# Patient Record
Sex: MALE | Race: WHITE | NOT HISPANIC OR LATINO | Employment: PART TIME | URBAN - METROPOLITAN AREA
[De-identification: names, ages, dates, MRNs, and addresses within clinical notes are randomized per-mention and may not be internally consistent; named-entity substitution may affect disease eponyms.]

---

## 2018-04-04 ENCOUNTER — TELEPHONE (OUTPATIENT)
Dept: OPHTHALMOLOGY | Facility: CLINIC | Age: 23
End: 2018-04-04

## 2018-05-15 ENCOUNTER — HOSPITAL ENCOUNTER (OUTPATIENT)
Dept: MRI IMAGING | Facility: CLINIC | Age: 23
Discharge: HOME | End: 2018-05-15
Attending: OPHTHALMOLOGY | Admitting: OPHTHALMOLOGY
Payer: COMMERCIAL

## 2018-05-15 ENCOUNTER — ANESTHESIA EVENT (OUTPATIENT)
Dept: SURGERY | Facility: CLINIC | Age: 23
End: 2018-05-15
Payer: COMMERCIAL

## 2018-05-15 ENCOUNTER — OFFICE VISIT (OUTPATIENT)
Dept: OPHTHALMOLOGY | Facility: CLINIC | Age: 23
End: 2018-05-15
Attending: OPHTHALMOLOGY
Payer: COMMERCIAL

## 2018-05-15 DIAGNOSIS — H50.05 ALTERNATING ESOTROPIA: Primary | ICD-10-CM

## 2018-05-15 DIAGNOSIS — H53.2 DIPLOPIA: ICD-10-CM

## 2018-05-15 DIAGNOSIS — H50.21 HYPERTROPIA OF RIGHT EYE: ICD-10-CM

## 2018-05-15 DIAGNOSIS — H50.05 ALTERNATING ESOTROPIA: ICD-10-CM

## 2018-05-15 PROCEDURE — 92015 DETERMINE REFRACTIVE STATE: CPT | Mod: ZF

## 2018-05-15 PROCEDURE — G0463 HOSPITAL OUTPT CLINIC VISIT: HCPCS | Mod: 25,ZF

## 2018-05-15 PROCEDURE — 70553 MRI BRAIN STEM W/O & W/DYE: CPT

## 2018-05-15 PROCEDURE — 92060 SENSORIMOTOR EXAMINATION: CPT | Mod: ZF | Performed by: OPHTHALMOLOGY

## 2018-05-15 PROCEDURE — 25000128 H RX IP 250 OP 636: Performed by: OPHTHALMOLOGY

## 2018-05-15 PROCEDURE — A9585 GADOBUTROL INJECTION: HCPCS | Performed by: OPHTHALMOLOGY

## 2018-05-15 RX ORDER — GADOBUTROL 604.72 MG/ML
7.5 INJECTION INTRAVENOUS ONCE
Status: COMPLETED | OUTPATIENT
Start: 2018-05-15 | End: 2018-05-15

## 2018-05-15 RX ADMIN — GADOBUTROL 6.2 ML: 604.72 INJECTION INTRAVENOUS at 15:56

## 2018-05-15 ASSESSMENT — CONF VISUAL FIELD
METHOD: COUNTING FINGERS
OD_NORMAL: 1
OS_NORMAL: 1

## 2018-05-15 ASSESSMENT — VISUAL ACUITY
OS_SC+: -1
OD_SC+: -2
METHOD: SNELLEN - LINEAR
OS_SC: 20/15
OD_SC: 20/15

## 2018-05-15 ASSESSMENT — REFRACTION
OS_SPHERE: +0.75
OS_CYLINDER: .25
OS_AXIS: 090
OD_SPHERE: +0.50
OD_CYLINDER: SPHERE

## 2018-05-15 ASSESSMENT — SLIT LAMP EXAM - LIDS
COMMENTS: NORMAL
COMMENTS: NORMAL

## 2018-05-15 ASSESSMENT — CUP TO DISC RATIO
OD_RATIO: 0.0
OS_RATIO: 0.0

## 2018-05-15 ASSESSMENT — EXTERNAL EXAM - RIGHT EYE: OD_EXAM: NORMAL

## 2018-05-15 ASSESSMENT — TONOMETRY: IOP_METHOD: BOTH EYES NORMAL BY PALPATION

## 2018-05-15 ASSESSMENT — EXTERNAL EXAM - LEFT EYE: OS_EXAM: NORMAL

## 2018-05-15 NOTE — MR AVS SNAPSHOT
After Visit Summary   5/15/2018    Lauro Stephen    MRN: [id number]           Patient Information     Date Of Birth          1995        Visit Information        Provider Department      5/15/2018 8:40 AM Lorri Guadalupe MD [facility] Peds Eye General        Today's Diagnoses     Alternating esotropia    -  1    Hypertropia of right eye        Diplopia           Follow-ups after your visit        Who to contact     Please call your clinic at [phone] to:    Ask questions about your health    Make or cancel appointments    Discuss your medicines    Learn about your test results    Speak to your doctor            Additional Information About Your Visit        MyChart Information     SocialDeck is an electronic gateway that provides easy, online access to your medical records. With SocialDeck, you can request a clinic appointment, read your test results, renew a prescription or communicate with your care team.     To sign up for SocialDeck visit the website at [email or web address]   You will be asked to enter the access code listed below, as well as some personal information. Please follow the directions to create your username and password.     Your access code is: [email or web address]  Expires: 2018  3:29 PM     Your access code will  in 90 days. If you need help or a new code, please contact your [facility] or call [phone] for assistance.        Care EveryWhere ID     This is your Care EveryWhere ID. This could be used by other organizations to access your [facility] medical records  [id number]         Blood Pressure from Last 3 Encounters:   18 115/67    Weight from Last 3 Encounters:   18 62 kg (136 lb 11 oz)              We Performed the Following     Sensorimotor        Primary Care Provider Fax #    Physician No Ref-Primary [phone]       No address on file        Equal Access to Services     LEI EDWARDS AH: Sonia oakes  Lesly, joel hobson, james paulann dobbs, figueroa morisin hayaan piedadcindy harrisonodessa lajohnathansandy kwesi. So [facility] [phone].    ATENCIÓN: Si habla español, tiene a rose disposición servicios gratuitos de asistencia lingüística. Javierame al [phone].    We comply with applicable federal civil rights laws and [place] laws. We do not discriminate on the basis of race, color, national origin, age, disability, sex, sexual orientation, or gender identity.            Thank you!     Thank you for choosing [facility] EYE GENERAL  for your care. Our goal is always to provide you with excellent care. Hearing back from our patients is one way we can continue to improve our services. Please take a few minutes to complete the written survey that you may receive in the mail after your visit with us. Thank you!             Your Updated Medication List - Protect others around you: Learn how to safely use, store and throw away your medicines at www.disposemymeds.org.      Notice  As of 5/15/2018 11:59 PM    You have not been prescribed any medications.

## 2018-05-15 NOTE — NURSING NOTE
Chief Complaint   Patient presents with     Strabismus Evaluation     Esotropia noted ~2yrs ago. Constant diplopia, but can ignore at times-turns his head to the left to use LE. Had glasses while in [place], but the Rx wasn't strong (no prisms). Dr. Bello didn't give prisms because deviation was so large. No h/o prior surgery, patching or other therapy. Scheduled for surgery tmw.      HPI    Informant(s):  pt    Symptoms:              Comments:  H/o fainting with IOP check in past.     No MRI, lab work done

## 2018-05-15 NOTE — LETTER
"May 15, 2018    Saúl Bello, [facility]  [address] [place] [address]  VIA Mail        RE:  MRN:  : Lauro Stephen  [id number]  1995     Dear Dr. Bello:    It was my pleasure to examine Lauro Stephen on 5/15/2018 at the [facility] at the [facility]. Please find my assessment and recommendations below. I have also attached the findings from today's examination to the end of this note for your records.    Chief Complaints and History of Present Illnesses   Patient presents with     Strabismus Evaluation     Esotropia noted ~2yrs ago. Constant diplopia, but can ignore at times-turns his head to the left to use LE. Had glasses while in [place], but the Rx wasn't strong (no prisms). Dr. Bello didn't give prisms because deviation was so large. No h/o prior surgery, patching or other therapy. Scheduled for surgery tmw.    Review of systems for the eyes was negative other than the pertinent positives and negatives noted in the HPI.  History is obtained from the patient    Referring provider: Saúl Bello     Primary care: No Ref-Primary, Physician   Assessment   Lauro is a 22-year-old male who presents with:       ICD-10-CM    1. 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 Hypertropia of right eye H50.21 Sensorimotor   3. Diplopia H53.2 MR Brain and Orbits         Plan  Lauro has diplopia and large angle esotropia.  I think he needs an MRI to rule out thyroid eye disease/Chiari/other intracranial process.  I recommend eye muscle surgery. Today with Lauro, I reviewed the indications, risks, benefits, and alternatives of eye muscle surgery including, but not limited to, failure obtain the desired ocular alignment (\"over\" or \"under\" correction), diplopia, and damage to any structure in or around the eye that may necessitate treatment with medicine, laser, or surgery. I further " "explained that the goal of surgery is to help control Lauro's strabismus. Surgery will not \"cure\" Lauro's strabismus or resolve/prevent the need for refractive corretion. Additional strabismus surgery may be required in the short or long term. I emphasized that regular follow-up to monitor and optimize his vision and alignment would be necessary. We also discussed the risks of surgical injury, bleeding, and infection which may necessitate further medical or surgical treatment and which may result in diplopia, loss of vision, blindness, or loss of the eye(s) in less than 1% of cases and the remote possibility of permanent damage to any organ system or death with the use of general anesthesia.  I explained that we would hide visible scars as much as possible in natural creases but that every patient heals and pigments differently resulting in a variable degree of scarring to the eyes or surrounding facial structures after surgery.  I provided multiple opportunities for questions, answered all questions to the best of my ability, and confirmed that my answers and my discussion were understood.       Further details of the management plan can be found in the \"Patient Instructions\" section which was printed and given to the patient at checkout.    Attending Physician Attestation:  Complete documentation of historical and exam elements from today's encounter can be found in the full encounter summary report (not reduplicated in this progress note).  I personally obtained the chief complaint(s) and history of present illness.  I confirmed and edited as necessary the review of systems, past medical/surgical history, family history, social history, and examination findings as documented by others; and I examined the patient myself.  I personally reviewed the relevant tests, images, and reports as documented above.  I formulated and edited as necessary the assessment and plan and discussed the findings and management " plan with the patient and family. - Lorri Guadalupe MD 5/15/2018 10:20 AM         Thank you for the opportunity to participate in Lauro's care. If you would like to discuss anything further, please do not hesitate to contact me.    Sincerely,    Lorri Guadalupe MD    CC  Lauro Stephen  [address] [address]  [address] Mail       Base Eye Exam     Visual Acuity (Snellen - Linear)      Right Left   Dist sc 20/15 -2 20/15 -1         Tonometry     Both eyes normal by palpation, 8:43 AM      Pupils      Pupils APD   Right PERRL None   Left PERRL None         Visual Fields (Counting fingers)      Left Right   Result Full Full         Neuro/Psych     Oriented x3:  Yes    Mood/Affect:  Normal      Dilation     Both eyes:  1.0% Mydriacyl, 2.5% Edwin Synephrine @ 8:43 AM            Additional Tests     Color      Right Left   Ishihara 11/11 11/11         Stereo     Fly:  -      [place] 4 Dot     Distance:  Diplopia    Near:  Diplopia      Double Silver Arvind     Right:  4 excyclo     Itorsion: none            Strabismus Exam        Method:  Alternate cover Distance Near Near +3.00DS Near Bifocals     Correction:  sc   ET' 35           RHT' 2           - - - - -tr  RET 40 -1 - - - -    R Tilt                         RET 35 -tr  - -  RET 35-37 - -  - -  RET 35       RHT 2     RHT 2       L Tilt       - - - - -1  RET 35 -tr - - - -            DVD:      DVD:           Nystagmus:  None       AHP:  left turn at times                  Fuses at near with 35 NIKKO, 37 NIKKO at dist (35 RE+2 LE), does note intermittent vertical, but resolves with blinking     Synptophore: ET 42 RH 4 Excylco 5 degrees  Fusional amps: 4 NIKKO, 4 BI, 2 up/ 1down RE, +stereo      Slit Lamp and Fundus Exam     External Exam      Right Left    External Normal Normal      Slit Lamp Exam      Right Left    Lids/Lashes Normal Normal    Conjunctiva/Sclera White and quiet White and quiet    Cornea Clear Clear    Anterior Chamber Deep and quiet Deep and  quiet    Iris Round and reactive Round and reactive    Lens Clear Clear    Vitreous Normal Normal      Fundus Exam      Right Left    Disc Normal Normal    C/D Ratio 0.0 0.0    Macula Normal Normal    Vessels Normal Normal    Periphery Normal Normal            Refraction     Cycloplegic Refraction      Sphere Cylinder Axis   Right +0.50 Sphere    Left +0.75 .25 090

## 2018-05-15 NOTE — PROGRESS NOTES
"Chief Complaints and History of Present Illnesses   Patient presents with     Strabismus Evaluation     Esotropia noted ~2yrs ago. Constant diplopia, but can ignore at times-turns his head to the left to use LE. Had glasses while in [place], but the Rx wasn't strong (no prisms). Dr. Bello didn't give prisms because deviation was so large. No h/o prior surgery, patching or other therapy. Scheduled for surgery tmw.    Review of systems for the eyes was negative other than the pertinent positives and negatives noted in the HPI.  History is obtained from the patient    Referring provider: Saúl Bello     Primary care: No Ref-Primary, Physician   Assessment   Lauro Stephen is a 22 year old male who presents with:       ICD-10-CM    1. 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 Hypertropia of right eye H50.21 Sensorimotor   3. Diplopia H53.2 MR Brain and Orbits         Plan  Lauro has diplopia and large angle esotropia.  I think he needs an MRI to rule out thyroid eye disease/Chiari/other intracranial process.  I recommend eye muscle surgery. Today with Lauro, I reviewed the indications, risks, benefits, and alternatives of eye muscle surgery including, but not limited to, failure obtain the desired ocular alignment (\"over\" or \"under\" correction), diplopia, and damage to any structure in or around the eye that may necessitate treatment with medicine, laser, or surgery. I further explained that the goal of surgery is to help control Lauro's strabismus. Surgery will not \"cure\" Lauro's strabismus or resolve/prevent the need for refractive corretion. Additional strabismus surgery may be required in the short or long term. I emphasized that regular follow-up to monitor and optimize his vision and alignment would be necessary. We also discussed the risks of surgical injury, bleeding, and infection which may necessitate further medical or surgical treatment and which may " "result in diplopia, loss of vision, blindness, or loss of the eye(s) in less than 1% of cases and the remote possibility of permanent damage to any organ system or death with the use of general anesthesia.  I explained that we would hide visible scars as much as possible in natural creases but that every patient heals and pigments differently resulting in a variable degree of scarring to the eyes or surrounding facial structures after surgery.  I provided multiple opportunities for questions, answered all questions to the best of my ability, and confirmed that my answers and my discussion were understood.       Further details of the management plan can be found in the \"Patient Instructions\" section which was printed and given to the patient at checkout.  Data Unavailable   Attending Physician Attestation:  Complete documentation of historical and exam elements from today's encounter can be found in the full encounter summary report (not reduplicated in this progress note).  I personally obtained the chief complaint(s) and history of present illness.  I confirmed and edited as necessary the review of systems, past medical/surgical history, family history, social history, and examination findings as documented by others; and I examined the patient myself.  I personally reviewed the relevant tests, images, and reports as documented above.  I formulated and edited as necessary the assessment and plan and discussed the findings and management plan with the patient and family. - Lorri Guadalupe MD 5/15/2018 10:20 AM       "

## 2018-05-16 ENCOUNTER — ANESTHESIA (OUTPATIENT)
Dept: SURGERY | Facility: CLINIC | Age: 23
End: 2018-05-16
Payer: COMMERCIAL

## 2018-05-16 ENCOUNTER — HOSPITAL ENCOUNTER (OUTPATIENT)
Facility: CLINIC | Age: 23
Discharge: HOME | End: 2018-05-16
Attending: OPHTHALMOLOGY | Admitting: OPHTHALMOLOGY
Payer: COMMERCIAL

## 2018-05-16 VITALS
BODY MASS INDEX: 21.97 KG/M2 | RESPIRATION RATE: 13 BRPM | WEIGHT: 136.69 LBS | DIASTOLIC BLOOD PRESSURE: 67 MMHG | HEIGHT: 66 IN | TEMPERATURE: 97.7 F | SYSTOLIC BLOOD PRESSURE: 115 MMHG | OXYGEN SATURATION: 96 %

## 2018-05-16 LAB — GLUCOSE BLDC GLUCOMTR-MCNC: 78 MG/DL (ref 70–99)

## 2018-05-16 PROCEDURE — 40000170 ZZH STATISTIC PRE-PROCEDURE ASSESSMENT II: Performed by: OPHTHALMOLOGY

## 2018-05-16 PROCEDURE — 25000128 H RX IP 250 OP 636: Performed by: NURSE ANESTHETIST, CERTIFIED REGISTERED

## 2018-05-16 PROCEDURE — 25000128 H RX IP 250 OP 636: Performed by: ANESTHESIOLOGY

## 2018-05-16 PROCEDURE — C9399 UNCLASSIFIED DRUGS OR BIOLOG: HCPCS | Performed by: NURSE ANESTHETIST, CERTIFIED REGISTERED

## 2018-05-16 PROCEDURE — 27210794 ZZH OR GENERAL SUPPLY STERILE: Performed by: OPHTHALMOLOGY

## 2018-05-16 PROCEDURE — 82962 GLUCOSE BLOOD TEST: CPT

## 2018-05-16 PROCEDURE — 25000566 ZZH SEVOFLURANE, EA 15 MIN: Performed by: OPHTHALMOLOGY

## 2018-05-16 PROCEDURE — 37000009 ZZH ANESTHESIA TECHNICAL FEE, EACH ADDTL 15 MIN: Performed by: OPHTHALMOLOGY

## 2018-05-16 PROCEDURE — 36000057 ZZH SURGERY LEVEL 3 1ST 30 MIN - UMMC: Performed by: OPHTHALMOLOGY

## 2018-05-16 PROCEDURE — 25000125 ZZHC RX 250: Performed by: NURSE ANESTHETIST, CERTIFIED REGISTERED

## 2018-05-16 PROCEDURE — 25000125 ZZHC RX 250: Performed by: OPHTHALMOLOGY

## 2018-05-16 PROCEDURE — 71000027 ZZH RECOVERY PHASE 2 EACH 15 MINS: Performed by: OPHTHALMOLOGY

## 2018-05-16 PROCEDURE — 25000132 ZZH RX MED GY IP 250 OP 250 PS 637: Performed by: ANESTHESIOLOGY

## 2018-05-16 PROCEDURE — 37000008 ZZH ANESTHESIA TECHNICAL FEE, 1ST 30 MIN: Performed by: OPHTHALMOLOGY

## 2018-05-16 PROCEDURE — 36000059 ZZH SURGERY LEVEL 3 EA 15 ADDTL MIN UMMC: Performed by: OPHTHALMOLOGY

## 2018-05-16 PROCEDURE — 71000014 ZZH RECOVERY PHASE 1 LEVEL 2 FIRST HR: Performed by: OPHTHALMOLOGY

## 2018-05-16 RX ORDER — HYDROMORPHONE HYDROCHLORIDE 1 MG/ML
.3-.5 INJECTION, SOLUTION INTRAMUSCULAR; INTRAVENOUS; SUBCUTANEOUS EVERY 5 MIN PRN
Status: DISCONTINUED | OUTPATIENT
Start: 2018-05-16 | End: 2018-05-16 | Stop reason: HOSPADM

## 2018-05-16 RX ORDER — OXYMETAZOLINE HYDROCHLORIDE 0.05 G/100ML
SPRAY NASAL PRN
Status: DISCONTINUED | OUTPATIENT
Start: 2018-05-16 | End: 2018-05-16 | Stop reason: HOSPADM

## 2018-05-16 RX ORDER — ACETAMINOPHEN 325 MG/1
975 TABLET ORAL ONCE
Status: COMPLETED | OUTPATIENT
Start: 2018-05-16 | End: 2018-05-16

## 2018-05-16 RX ORDER — KETOROLAC TROMETHAMINE 30 MG/ML
INJECTION, SOLUTION INTRAMUSCULAR; INTRAVENOUS PRN
Status: DISCONTINUED | OUTPATIENT
Start: 2018-05-16 | End: 2018-05-16

## 2018-05-16 RX ORDER — ONDANSETRON 2 MG/ML
4 INJECTION INTRAMUSCULAR; INTRAVENOUS EVERY 30 MIN PRN
Status: DISCONTINUED | OUTPATIENT
Start: 2018-05-16 | End: 2018-05-16 | Stop reason: HOSPADM

## 2018-05-16 RX ORDER — FENTANYL CITRATE 50 UG/ML
INJECTION, SOLUTION INTRAMUSCULAR; INTRAVENOUS PRN
Status: DISCONTINUED | OUTPATIENT
Start: 2018-05-16 | End: 2018-05-16

## 2018-05-16 RX ORDER — EPHEDRINE SULFATE 50 MG/ML
INJECTION, SOLUTION INTRAMUSCULAR; INTRAVENOUS; SUBCUTANEOUS PRN
Status: DISCONTINUED | OUTPATIENT
Start: 2018-05-16 | End: 2018-05-16

## 2018-05-16 RX ORDER — FENTANYL CITRATE 50 UG/ML
25-50 INJECTION, SOLUTION INTRAMUSCULAR; INTRAVENOUS
Status: DISCONTINUED | OUTPATIENT
Start: 2018-05-16 | End: 2018-05-16 | Stop reason: HOSPADM

## 2018-05-16 RX ORDER — SODIUM CHLORIDE, SODIUM LACTATE, POTASSIUM CHLORIDE, CALCIUM CHLORIDE 600; 310; 30; 20 MG/100ML; MG/100ML; MG/100ML; MG/100ML
INJECTION, SOLUTION INTRAVENOUS CONTINUOUS
Status: DISCONTINUED | OUTPATIENT
Start: 2018-05-16 | End: 2018-05-16 | Stop reason: HOSPADM

## 2018-05-16 RX ORDER — GABAPENTIN 100 MG/1
300 CAPSULE ORAL ONCE
Status: COMPLETED | OUTPATIENT
Start: 2018-05-16 | End: 2018-05-16

## 2018-05-16 RX ORDER — ONDANSETRON 4 MG/1
4 TABLET, ORALLY DISINTEGRATING ORAL EVERY 30 MIN PRN
Status: DISCONTINUED | OUTPATIENT
Start: 2018-05-16 | End: 2018-05-16 | Stop reason: HOSPADM

## 2018-05-16 RX ORDER — BALANCED SALT SOLUTION 6.4; .75; .48; .3; 3.9; 1.7 MG/ML; MG/ML; MG/ML; MG/ML; MG/ML; MG/ML
SOLUTION OPHTHALMIC PRN
Status: DISCONTINUED | OUTPATIENT
Start: 2018-05-16 | End: 2018-05-16 | Stop reason: HOSPADM

## 2018-05-16 RX ORDER — ONDANSETRON 2 MG/ML
INJECTION INTRAMUSCULAR; INTRAVENOUS PRN
Status: DISCONTINUED | OUTPATIENT
Start: 2018-05-16 | End: 2018-05-16

## 2018-05-16 RX ORDER — NALOXONE HYDROCHLORIDE 0.4 MG/ML
.1-.4 INJECTION, SOLUTION INTRAMUSCULAR; INTRAVENOUS; SUBCUTANEOUS
Status: DISCONTINUED | OUTPATIENT
Start: 2018-05-16 | End: 2018-05-16 | Stop reason: HOSPADM

## 2018-05-16 RX ORDER — PROPOFOL 10 MG/ML
INJECTION, EMULSION INTRAVENOUS PRN
Status: DISCONTINUED | OUTPATIENT
Start: 2018-05-16 | End: 2018-05-16

## 2018-05-16 RX ADMIN — Medication 5 MG: at 14:17

## 2018-05-16 RX ADMIN — FENTANYL CITRATE 25 MCG: 50 INJECTION INTRAMUSCULAR; INTRAVENOUS at 14:46

## 2018-05-16 RX ADMIN — KETOROLAC TROMETHAMINE 30 MG: 30 INJECTION, SOLUTION INTRAMUSCULAR at 14:19

## 2018-05-16 RX ADMIN — SODIUM CHLORIDE, POTASSIUM CHLORIDE, SODIUM LACTATE AND CALCIUM CHLORIDE: 600; 310; 30; 20 INJECTION, SOLUTION INTRAVENOUS at 14:25

## 2018-05-16 RX ADMIN — GABAPENTIN 300 MG: 300 CAPSULE ORAL at 12:20

## 2018-05-16 RX ADMIN — SUGAMMADEX 120 MG: 100 INJECTION, SOLUTION INTRAVENOUS at 14:21

## 2018-05-16 RX ADMIN — ONDANSETRON 4 MG: 2 INJECTION INTRAMUSCULAR; INTRAVENOUS at 14:19

## 2018-05-16 RX ADMIN — PHENYLEPHRINE HYDROCHLORIDE 50 MCG: 10 INJECTION, SOLUTION INTRAMUSCULAR; INTRAVENOUS; SUBCUTANEOUS at 13:56

## 2018-05-16 RX ADMIN — MIDAZOLAM 2 MG: 1 INJECTION INTRAMUSCULAR; INTRAVENOUS at 13:12

## 2018-05-16 RX ADMIN — PROPOFOL 150 MG: 10 INJECTION, EMULSION INTRAVENOUS at 13:23

## 2018-05-16 RX ADMIN — FENTANYL CITRATE 50 MCG: 50 INJECTION, SOLUTION INTRAMUSCULAR; INTRAVENOUS at 13:25

## 2018-05-16 RX ADMIN — PHENYLEPHRINE HYDROCHLORIDE 50 MCG: 10 INJECTION, SOLUTION INTRAMUSCULAR; INTRAVENOUS; SUBCUTANEOUS at 13:54

## 2018-05-16 RX ADMIN — PROPOFOL 50 MG: 10 INJECTION, EMULSION INTRAVENOUS at 13:25

## 2018-05-16 RX ADMIN — FENTANYL CITRATE 50 MCG: 50 INJECTION, SOLUTION INTRAMUSCULAR; INTRAVENOUS at 13:22

## 2018-05-16 RX ADMIN — SODIUM CHLORIDE, POTASSIUM CHLORIDE, SODIUM LACTATE AND CALCIUM CHLORIDE: 600; 310; 30; 20 INJECTION, SOLUTION INTRAVENOUS at 13:12

## 2018-05-16 RX ADMIN — ROCURONIUM BROMIDE 30 MG: 10 INJECTION INTRAVENOUS at 13:24

## 2018-05-16 RX ADMIN — PHENYLEPHRINE HYDROCHLORIDE 100 MCG: 10 INJECTION, SOLUTION INTRAMUSCULAR; INTRAVENOUS; SUBCUTANEOUS at 14:11

## 2018-05-16 RX ADMIN — FENTANYL CITRATE 50 MCG: 50 INJECTION INTRAMUSCULAR; INTRAVENOUS at 14:53

## 2018-05-16 RX ADMIN — PHENYLEPHRINE HYDROCHLORIDE 50 MCG: 10 INJECTION, SOLUTION INTRAMUSCULAR; INTRAVENOUS; SUBCUTANEOUS at 14:20

## 2018-05-16 RX ADMIN — ACETAMINOPHEN 975 MG: 325 TABLET ORAL at 12:48

## 2018-05-16 NOTE — ANESTHESIA CARE TRANSFER NOTE
Patient: Lauro Stephen    Procedure(s):  Bilateral Strabismus Repair - Wound Class: I-Clean    Diagnosis: Strabismus   Diagnosis Additional Information: No value filed.    Anesthesia Type:   General, ETT     Note:  Airway :Face Mask  Patient transferred to:PACU  Handoff Report: Identifed the Patient, Identified the Reponsible Provider, Reviewed the pertinent medical history, Discussed the surgical course, Reviewed Intra-OP anesthesia mangement and issues during anesthesia, Set expectations for post-procedure period and Allowed opportunity for questions and acknowledgement of understanding      Vitals: (Last set prior to Anesthesia Care Transfer)    CRNA VITALS  5/16/2018 1407 - 5/16/2018 1449      5/16/2018             NIBP: 115/69    Pulse: 116    NIBP Mean: 85    SpO2: 100 %    Resp Rate (observed): (!)  1                Electronically Signed By: TIEN Wallace CRNA  May 16, 2018  2:49 PM

## 2018-05-16 NOTE — IP AVS SNAPSHOT
MRN:[id number]                      After Visit Summary   5/16/2018    Lauro Stephen    MRN: [id number]           Thank you!     Thank you for choosing [facility] for your care. Our goal is always to provide you with excellent care. Hearing back from our patients is one way we can continue to improve our services. Please take a few minutes to complete the written survey that you may receive in the mail after you visit with us. Thank you!        Patient Information     Date Of Birth          1995        About your hospital stay     You were admitted on:  May 16, 2018 You last received care in the:  [facility] PACU    You were discharged on:  May 16, 2018       Who to Call     For medical emergencies, please call 911.  For non-urgent questions about your medical care, please call your primary care provider or clinic, None  For questions related to your surgery, please call your surgery clinic        Attending Provider     Provider Lorri Iverson MD Ophthalmology       Primary Care Provider Fax #    Physician No Ref-Primary [phone]      Your next 10 appointments already scheduled     May 17, 2018  9:20 AM CDT   Post-Op with Lorri Guadalupe MD   [facility] Peds Eye General ([facility])    [address] [address] [phone]              Further instructions from your care team       Strabismus Repair Discharge Instructions    Dr. Lorri Guadalupe, Dr. Solitario Ellis      Your eye doctor performed surgery to help align your eye(s). During surgery, certain eye muscles are adjusted. This helps the muscles better control how the eye moves. Often, surgery is done in addition to other treatments.   How Surgery Works  Strabismus surgery is a safe, common operation. The doctor changes the placement or length of an eye muscle. This small change can pull the eye into proper alignment. The two most common methods of surgery are:    Recession,  in which a muscle is moved to a new position on the eye.    Resection, in which a small section of an eye muscle is removed.  What to Expect  It is normal to experience the following:    Eye Redness. This will resolve slowly over 2- 4 weeks.    Pink tinged tears    Swollen, painful or itchy eyes    Sensitivity to bright lights.    Trouble opening eyes for 1-2 days.    Feeling dizzy or off balance until you get used to seeing differently.  After Surgery Care    Avoid rubbing your eyes.  o You may use cool cloths to help with pain and/or itching.    Minimize direct contact with water for 1 week. Showering or bathing is allowed.  o You may use a warm, wet washcloth to remove any dried drainage from the eye. Wipe eye from inner corner to the outer corner of the eye.     No swimming for 1 week.    Use sunglasses or a hat to protect eyes from bright lights if you are light sensitive.    You may wear glasses as soon as needed.    No heavy lifting or contact sports for 1 week.     Use eye drops or eye ointment as directed to prevent infection and decrease swelling. Avoid touching surface of eye with the tube or bottle.   Suture Care  Sutures will dissolve over several weeks; they will not need to be removed.   Adjustable sutures may have been placed during surgery. You may need to stay in the recovery room for a longer period after surgery before a possible suture adjustment is performed. Once the suture is adjusted you will be sent home.   When to Call the Doctor     Eyelids are progressively getting more swollen and painful after 24 hours.    You see a dramatic change in the position of the eye over time.    Frequent or continuous vomiting.    Green or yellow drainage from the eye.    Temperature over 101 degrees.  If you experience worsening RSVP (Redness, Sensitivity to light, Vision, Pain), or develops fever or worsening discharge, call EITHER    [phone] (8am-4:30pm Monday-Friday)    [phone] (after hours  & weekends)  and ask to speak with the Ophthalmology Resident or Fellow On-Call or return to the eye clinic or emergency room immediately.        If your child is unable to tolerate food and drink, vomits 3 times, or appears to have decreased alertness or lethargy, return to the emergency room immediately. These can be signs of delayed gastrointestinal wake-up after anesthesia and your child may need IV fluids to prevent dehydration.    Follow Up  Follow up with your doctor as arranged.  Rev. 3/2014    [facility], [place]  Same-Day Surgery   Adult Discharge Orders & Instructions     For 24 hours after surgery    1. Get plenty of rest.  A responsible adult must stay with you for at least 24 hours after you leave the hospital.   2. Do not drive or use heavy equipment.  If you have weakness or tingling, don't drive or use heavy equipment until this feeling goes away.  3. Do not drink alcohol.  4. Avoid strenuous or risky activities.  Ask for help when climbing stairs.   5. You may feel lightheaded.  IF so, sit for a few minutes before standing.  Have someone help you get up.   6. If you have nausea (feel sick to your stomach): Drink only clear liquids such as apple juice, ginger ale, broth or 7-Up.  Rest may also help.  Be sure to drink enough fluids.  Move to a regular diet as you feel able.  7. You may have a slight fever. Call the doctor if your fever is over 100 F (37.7 C) (taken under the tongue) or lasts longer than 24 hours.  8. You may have a dry mouth, a sore throat, muscle aches or trouble sleeping.  These should go away after 24 hours.  9. Do not make important or legal decisions.   Call your doctor for any of the followin.  Signs of infection (fever, growing tenderness at the surgery site, a large amount of drainage or bleeding, severe pain, foul-smelling drainage, redness, swelling).    2. It has been over 8 to 10 hours since surgery and you are still not able to urinate  "(pass water).    3.  Headache for over 24 hours.    4.  Numbness, tingling or weakness the day after surgery (if you had spinal anesthesia).  To contact a doctor, call ________________________________________ or:        [phone] and ask for the resident on call for   ______________________________________________ (answered 24 hours a day)      Emergency Department:    [facility]: [phone]       (TTY for hearing impaired: [phone])    [facility]: [phone]       (TTY for hearing impaired: [phone])          Pending Results     No orders found from 2018 to 2018.            Admission Information     Date & Time Provider Department Dept. Phone    2018 Lorri Guadalupe MD [facility] PACU [phone]      Your Vitals Were     Blood Pressure Temperature Respirations Height Weight Pulse Oximetry    115/68 98.8  F (37.1  C) (Oral) 9 1.676 m (5' 6\") 62 kg (136 lb 11 oz) 95%    BMI (Body Mass Index)                   22.06 kg/m2           MyChart Information     CloudAmboÂ® lets you send messages to your doctor, view your test results, renew your prescriptions, schedule appointments and more. To sign up, go to [email or web address] . Click on \"Log in\" on the left side of the screen, which will take you to the Welcome page. Then click on \"Sign up Now\" on the right side of the page.     You will be asked to enter the access code listed below, as well as some personal information. Please follow the directions to create your username and password.     Your access code is: [email or web address]  Expires: 2018  3:29 PM     Your access code will  in 90 days. If you need help or a new code, please call your [facility] clinic or [phone].        Care EveryWhere ID     This is your Care EveryWhere ID. This could be used by other organizations to access your [facility] medical records  [id number]        Equal Access to Services     LEI NGUYEN: joel Garcia " james hobson waxay idiin venkatsandy herbertcindy harrisonodessa lajohnathansandy ah. So [facility] [phone].    ATENCIÓN: Si oni estrella, tiene a rose disposición servicios gratuitos de asistencia lingüística. Llame al [phone].    We comply with applicable federal civil rights laws and [place] laws. We do not discriminate on the basis of race, color, national origin, age, disability, sex, sexual orientation, or gender identity.               Review of your medicines      Notice     You have not been prescribed any medications.             Protect others around you: Learn how to safely use, store and throw away your medicines at www.disposemymeds.org.             Medication List: This is a list of all your medications and when to take them. Check marks below indicate your daily home schedule. Keep this list as a reference.      Notice     You have not been prescribed any medications.

## 2018-05-16 NOTE — ANESTHESIA PREPROCEDURE EVALUATION
Anesthesia Evaluation     . Pt has had prior anesthetic. Type: General    No history of anesthetic complications          ROS/MED HX    ENT/Pulmonary:  - neg pulmonary ROS     Neurologic:  - neg neurologic ROS     Cardiovascular:  - neg cardiovascular ROS       METS/Exercise Tolerance:  >4 METS   Hematologic:  - neg hematologic  ROS       Musculoskeletal:  - neg musculoskeletal ROS       GI/Hepatic:  - neg GI/hepatic ROS       Renal/Genitourinary:  - ROS Renal section negative       Endo:  - neg endo ROS       Psychiatric:         Infectious Disease:         Malignancy:      - no malignancy   Other:    - neg other ROS               Procedure: Procedure(s):  Bilateral Strabismus Repair - Wound Class:     HPI: Lauro Stephen is a 22 year old male who is presenting for above stated procedure.    PMHx/PSHx/ROS:  Past Medical History:   Diagnosis Date     Esotropia        Past Surgical History:   Procedure Laterality Date     dental work       TONSILLECTOMY      age 4       ROS as stated above    Soc Hx:   Social History   Substance Use Topics     Smoking status: Never Smoker     Smokeless tobacco: Never Used     Alcohol use Not on file       Allergies:   Allergies   Allergen Reactions     Sulfa Drugs Shortness Of Breath     Penicillin G Rash       Meds:   No prescriptions prior to admission.       No current outpatient prescriptions on file.       Physical Exam:  VS:      , Weight   Wt Readings from Last 2 Encounters:   No data found for Wt       Labs:    BMP:  No results for input(s): NA, POTASSIUM, CHLORIDE, CO2, BUN, CR, GLC, CLAUDIO in the last 74244 hours.  LFTs:   No results for input(s): PROTTOTAL, ALBUMIN, BILITOTAL, ALKPHOS, AST, ALT, BILIDIRECT in the last 06131 hours.  CBC:   No results for input(s): WBC, RBC, HGB, HCT, MCV, MCH, MCHC, RDW, PLT in the last 12781 hours.  Coags:  No results for input(s): INR, PTT, FIBR in the last 48613 hours.        Physical Exam  Normal systems: dental    Airway   Mallampati:  I  TM distance: >3 FB  Neck ROM: full    Dental     Cardiovascular   Rhythm and rate: regular and normal      Pulmonary    breath sounds clear to auscultation        Procedure: Procedure(s):  Bilateral Strabismus Repair - Wound Class: I-Clean    HPI: Lauro Stephen is a 22 year old male who is presenting for above stated procedure.    PMHx/PSHx/ROS:  Past Medical History:   Diagnosis Date     Esotropia        Past Surgical History:   Procedure Laterality Date     dental work       TONSILLECTOMY      age 4       ROS as stated above    Soc Hx:   Social History   Substance Use Topics     Smoking status: Never Smoker     Smokeless tobacco: Never Used     Alcohol use Not on file       Allergies:   Allergies   Allergen Reactions     Sulfa Drugs Shortness Of Breath     Penicillin G Rash       Meds:   No prescriptions prior to admission.       No current outpatient prescriptions on file.       Physical Exam:  VS: Temp:  [37  C (98.6  F)] 37  C (98.6  F)  Heart Rate:  [89] 89  Resp:  [14] 14  BP: (113)/(81) 113/81  SpO2:  [97 %] 97 %   97%, Weight   Wt Readings from Last 2 Encounters:   05/16/18 62 kg (136 lb 11 oz)       Labs:    BMP:  No results for input(s): NA, POTASSIUM, CHLORIDE, CO2, BUN, CR, GLC, CLAUDIO in the last 46786 hours.  LFTs:   No results for input(s): PROTTOTAL, ALBUMIN, BILITOTAL, ALKPHOS, AST, ALT, BILIDIRECT in the last 88495 hours.  CBC:   No results for input(s): WBC, RBC, HGB, HCT, MCV, MCH, MCHC, RDW, PLT in the last 34946 hours.  Coags:  No results for input(s): INR, PTT, FIBR in the last 81895 hours.                Anesthesia Plan      History & Physical Review  History and physical reviewed and following examination; no interval change.    ASA Status:  2 .        Plan for General and ETT with Intravenous and Propofol induction. Maintenance will be Balanced.    PONV prophylaxis:  Ondansetron (or other 5HT-3) and Dexamethasone or Solumedrol       Postoperative Care  Postoperative pain management:  Oral  pain medications and Multi-modal analgesia.      Consents  Anesthetic plan, risks, benefits and alternatives discussed with:  Patient..        I have personally discussed the risks and benefits of anesthesia with the patient and patient agrees to proceed.    Dillon Yost MD  Anesthesiology                  .

## 2018-05-16 NOTE — ANESTHESIA POSTPROCEDURE EVALUATION
Patient: Lauro Stephen    Procedure(s):  Bilateral Strabismus Repair - Wound Class: I-Clean    Diagnosis:Strabismus   Diagnosis Additional Information: No value filed.    Anesthesia Type:  General, ETT    Note:  Anesthesia Post Evaluation    Patient location during evaluation: PACU  Patient participation: Able to fully participate in evaluation  Level of consciousness: awake  Pain management: adequate  Airway patency: patent  Cardiovascular status: acceptable and stable  Respiratory status: acceptable and room air  Hydration status: acceptable  PONV: none     Anesthetic complications: None          Last vitals:  Vitals:    05/16/18 1515 05/16/18 1530 05/16/18 1545   BP: 118/61 108/61 115/68   Resp: 12 25 9   Temp: 37.1  C (98.8  F)     SpO2: 100% 99% 95%         Electronically Signed By: Evangelist Escalera MD  May 16, 2018  4:02 PM

## 2018-05-16 NOTE — DISCHARGE INSTRUCTIONS
Strabismus Repair Discharge Instructions    Dr. Lorri Guadalupe, Dr. Solitario Ellis      Your eye doctor performed surgery to help align your eye(s). During surgery, certain eye muscles are adjusted. This helps the muscles better control how the eye moves. Often, surgery is done in addition to other treatments.   How Surgery Works  Strabismus surgery is a safe, common operation. The doctor changes the placement or length of an eye muscle. This small change can pull the eye into proper alignment. The two most common methods of surgery are:    Recession, in which a muscle is moved to a new position on the eye.    Resection, in which a small section of an eye muscle is removed.  What to Expect  It is normal to experience the following:    Eye Redness. This will resolve slowly over 2- 4 weeks.    Pink tinged tears    Swollen, painful or itchy eyes    Sensitivity to bright lights.    Trouble opening eyes for 1-2 days.    Feeling dizzy or off balance until you get used to seeing differently.  After Surgery Care    Avoid rubbing your eyes.  o You may use cool cloths to help with pain and/or itching.    Minimize direct contact with water for 1 week. Showering or bathing is allowed.  o You may use a warm, wet washcloth to remove any dried drainage from the eye. Wipe eye from inner corner to the outer corner of the eye.     No swimming for 1 week.    Use sunglasses or a hat to protect eyes from bright lights if you are light sensitive.    You may wear glasses as soon as needed.    No heavy lifting or contact sports for 1 week.     Use eye drops or eye ointment as directed to prevent infection and decrease swelling. Avoid touching surface of eye with the tube or bottle.   Suture Care  Sutures will dissolve over several weeks; they will not need to be removed.   Adjustable sutures may have been placed during surgery. You may need to stay in the recovery room for a longer period after surgery before a possible suture adjustment is  performed. Once the suture is adjusted you will be sent home.   When to Call the Doctor     Eyelids are progressively getting more swollen and painful after 24 hours.    You see a dramatic change in the position of the eye over time.    Frequent or continuous vomiting.    Green or yellow drainage from the eye.    Temperature over 101 degrees.  If you experience worsening RSVP (Redness, Sensitivity to light, Vision, Pain), or develops fever or worsening discharge, call EITHER    [phone] (8am-4:30pm Monday-Friday)    [phone] (after hours & weekends)  and ask to speak with the Ophthalmology Resident or Fellow On-Call or return to the eye clinic or emergency room immediately.        If your child is unable to tolerate food and drink, vomits 3 times, or appears to have decreased alertness or lethargy, return to the emergency room immediately. These can be signs of delayed gastrointestinal wake-up after anesthesia and your child may need IV fluids to prevent dehydration.    Follow Up  Follow up with your doctor as arranged.  Rev. 3/2014    [facility], [place]  Same-Day Surgery   Adult Discharge Orders & Instructions     For 24 hours after surgery    1. Get plenty of rest.  A responsible adult must stay with you for at least 24 hours after you leave the hospital.   2. Do not drive or use heavy equipment.  If you have weakness or tingling, don't drive or use heavy equipment until this feeling goes away.  3. Do not drink alcohol.  4. Avoid strenuous or risky activities.  Ask for help when climbing stairs.   5. You may feel lightheaded.  IF so, sit for a few minutes before standing.  Have someone help you get up.   6. If you have nausea (feel sick to your stomach): Drink only clear liquids such as apple juice, ginger ale, broth or 7-Up.  Rest may also help.  Be sure to drink enough fluids.  Move to a regular diet as you feel able.  7. You may have a slight fever. Call the doctor if  your fever is over 100 F (37.7 C) (taken under the tongue) or lasts longer than 24 hours.  8. You may have a dry mouth, a sore throat, muscle aches or trouble sleeping.  These should go away after 24 hours.  9. Do not make important or legal decisions.   Call your doctor for any of the followin.  Signs of infection (fever, growing tenderness at the surgery site, a large amount of drainage or bleeding, severe pain, foul-smelling drainage, redness, swelling).    2. It has been over 8 to 10 hours since surgery and you are still not able to urinate (pass water).    3.  Headache for over 24 hours.    4.  Numbness, tingling or weakness the day after surgery (if you had spinal anesthesia).  To contact a doctor, call ________________________________________ or:        [phone] and ask for the resident on call for   ______________________________________________ (answered 24 hours a day)      Emergency Department:    Chase Wittman: [phone]       (TTY for hearing impaired: [phone])    Duluth Wittman: [phone]       (TTY for hearing impaired: [phone])

## 2018-05-16 NOTE — IP AVS SNAPSHOT
[facility]    [address] [address]    Phone:  [phone]                                       After Visit Summary   5/16/2018    Lauro Stephen    MRN: [id number]           After Visit Summary Signature Page     I have received my discharge instructions, and my questions have been answered. I have discussed any challenges I see with this plan with the nurse or doctor.    ..........................................................................................................................................  Patient/Patient Representative Signature      ..........................................................................................................................................  Patient Representative Print Name and Relationship to Patient    ..................................................               ................................................  Date                                            Time    ..........................................................................................................................................  Reviewed by Signature/Title    ...................................................              ..............................................  Date                                                            Time

## 2018-05-17 ENCOUNTER — OFFICE VISIT (OUTPATIENT)
Dept: OPHTHALMOLOGY | Facility: CLINIC | Age: 23
End: 2018-05-17
Attending: OPHTHALMOLOGY
Payer: COMMERCIAL

## 2018-05-17 DIAGNOSIS — H53.2 DIPLOPIA: ICD-10-CM

## 2018-05-17 DIAGNOSIS — H50.05 ALTERNATING ESOTROPIA: Primary | ICD-10-CM

## 2018-05-17 PROCEDURE — G0463 HOSPITAL OUTPT CLINIC VISIT: HCPCS | Mod: ZF | Performed by: TECHNICIAN/TECHNOLOGIST

## 2018-05-17 ASSESSMENT — VISUAL ACUITY
OD_SC+: -2
METHOD: SNELLEN - LINEAR
OS_SC: 20/20
OD_SC: 20/20

## 2018-05-17 ASSESSMENT — CONF VISUAL FIELD
OS_NORMAL: 1
OD_NORMAL: 1

## 2018-05-17 NOTE — MR AVS SNAPSHOT
After Visit Summary   2018    Lauro Stephen    MRN: [id number]           Patient Information     Date Of Birth          1995        Visit Information        Provider Department      2018 9:20 AM Lorri Guadalupe MD [facility] Eye General         Follow-ups after your visit        Who to contact     Please call your clinic at [phone] to:    Ask questions about your health    Make or cancel appointments    Discuss your medicines    Learn about your test results    Speak to your doctor            Additional Information About Your Visit        MyChart Information     [employer] is an electronic gateway that provides easy, online access to your medical records. With [employer], you can request a clinic appointment, read your test results, renew a prescription or communicate with your care team.     To sign up for [employer] visit the website at [email or web address]   You will be asked to enter the access code listed below, as well as some personal information. Please follow the directions to create your username and password.     Your access code is: [email or web address]  Expires: 2018  3:29 PM     Your access code will  in 90 days. If you need help or a new code, please contact your [facility] or call [phone] for assistance.        Care EveryWhere ID     This is your Care EveryWhere ID. This could be used by other organizations to access your [facility] medical records  [id number]         Blood Pressure from Last 3 Encounters:   18 115/67    Weight from Last 3 Encounters:   18 62 kg (136 lb 11 oz)              Today, you had the following     No orders found for display       Primary Care Provider Fax #    Physician No Ref-Primary [phone]       No address on file        Equal Access to Services     LEI EDWARDS : Sonia Grace, wavashti hobson, figueroa blevins  ah. So [facility] [phone].    ATENCIÓN: Si habla delores, tiene a rose disposición servicios gratuitos de asistencia lingüística. Llaniyah al [phone].    We comply with applicable federal civil rights laws and [place] laws. We do not discriminate on the basis of race, color, national origin, age, disability, sex, sexual orientation, or gender identity.            Thank you!     Thank you for choosing [facility]  for your care. Our goal is always to provide you with excellent care. Hearing back from our patients is one way we can continue to improve our services. Please take a few minutes to complete the written survey that you may receive in the mail after your visit with us. Thank you!             Your Updated Medication List - Protect others around you: Learn how to safely use, store and throw away your medicines at www.disposemymeds.org.      Notice  As of 5/17/2018 10:16 AM    You have not been prescribed any medications.

## 2018-05-17 NOTE — NURSING NOTE
Chief Complaint   Patient presents with     Post Op (Ophthalmology) Both Eyes     some nausea after surgery none today, slept well last night, + pain with eye movements, + horizontal diplopia at distance only, ET noticed only at distace - much improved, no VA changes      HPI    Informant(s):  patient    Affected eye(s):  Both   Symptoms:

## 2018-05-17 NOTE — OP NOTE
Procedure Date: 05/16/2018      PREOPERATIVE DIAGNOSES:   1.  Acquired esotropia.   2.  Diplopia.   3.  Type 1 Chiari malformation on MRI scan.      POSTOPERATIVE DIAGNOSES:   1.  Acquired esotropia.   2.  Diplopia.   3.  Type 1 Chiari malformation on MRI scan.       PROCEDURES:   1.  Forced duction testing.   2.  Bimedial rectus recession of 5.5 mm.      SURGEON:  Lorri Guadalupe MD      ANESTHESIA:  General.      ESTIMATED BLOOD LOSS:  1 mL.      COMPLICATIONS:  None.      INDICATIONS FOR PROCEDURE:  Lauro is a 22-year-old young man who had onset of diplopia around age 20, which has slowly progressed to a 35 prism diopter esotropia.  He now has constant diplopia.  An MRI scan was ordered and he was noted to have a type 1 Chiari malformation.  These findings were discussed, and the MRI scan was reviewed by Dr. Hutchinson of Neurosurgery.  The options for Lauro included decompression of the Chiari malformation versus strabismus repair.  Because Lauro does not have significant headaches, we elected to proceed with strabismus repair.  The risks, benefits and alternatives to strabismus repair include but are not limited to infection, bleeding, loss of vision, over or under correction of his alignment, poor cosmesis, possibility of under correction or recurrence in the setting of a Chiari malformation.  Lauro elected to proceed.  We also discussed a possibility of an adjustable suture but Lauro has a sensitive vasovagal response and we did not elect to proceed with an adjustable suture.      DETAILS OF PROCEDURE:  After informed consent was obtained, Lauro was taken to the operating room where general anesthesia was induced without complication.  He was prepped and draped in sterile ophthalmic fashion.  A timeout was performed.        Lid speculae were placed in both eyes and forced duction testing was performed.  There was trace tightness to abduction in both eyes.  Lid speculum was removed from the  left eye and occluder was placed.  5-0 silk traction suture placed at 6 and 12 o'clock of the right eye and the eye was placed in the abducted position.  Nasal conjunctival peritomy was performed.  The inferonasal and superonasal quadrants were dissected.  The right medial rectus muscle was hooked using small and [place] hooks.  The Tenon's was cleaned posterior from the muscle belly.  A 6-0 double-arm Vicryl suture was used to imbricate the muscle at the insertion using central and peripheral locking bites.  The muscle was disinserted from the globe.  Cautery was used for hemostasis.  A caliper was used to measure 5.5 mm posterior to the original insertion.  This was marked with a marking pen.  Scleral passes are performed at these marks and muscles tied down.  An 8-0 Vicryl suture was used to repair the conjunctiva.  Lid speculum and traction sutures were removed from the right eye.        Attention was then turned to the left eye where an identical procedure was performed.  TobraDex ointment was placed in both eyes.  Lauro tolerated the procedure well and went to the recovery room in stable condition.  He will follow up on postoperative day #1 in the Eye Clinic.         RICO LE MD             D: 2018   T: 2018   MT: KIMANI      Name:     LAURO FRIEDMAN   MRN:      [id number]        Account:        [id number]   :      1995           Procedure Date: 2018      Document: [id number]

## 2018-05-17 NOTE — LETTER
"2018    Saúl Bello, [facility]  [address] [address]  [place]       RE:  MRN:  : Lauro Stephen  [id number]  1995     Dear Dr. Bello:    It was my pleasure to examine Lauro Stephen on 2018 at the [facility] at the [facility]. Please find my assessment and recommendations below. I have also attached the findings from today's examination to the end of this note for your records.    Chief Complaints and History of Present Illnesses   Patient presents with     Post Op (Ophthalmology) Both Eyes     Some nausea after surgery none today, slept well last night, + pain with eye movements, + horizontal diplopia at distance only, ET noticed only at distace - much improved, no VA changes    Review of systems for the eyes was negative other than the pertinent positives and negatives noted in the HPI.  History is obtained from the patient and mother.    Referring provider: Saúl Bello     Primary care: No Ref-Primary, Physician   Assessment   Lauro is a 22-year-old male who presents with:       ICD-10-CM    1. 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 Diplopia H53.2          Plan  Lauro is POD #1 s/p BMRc 5.5 millimeters.  He has undercorrection at distance but can fuse at near.  Call with increasing pain, lid swelling and redness, and discharge.  Tobradex ointment BID x 1 week.  F/u 2-3 months, try to coordinate with neurosurgery appointment for Chiari malformation found on pre-op MRI.       Further details of the management plan can be found in the \"Patient Instructions\" section which was printed and given to the patient at checkout.  Return in about 3 months (around 2018).   Attending Physician Attestation:  Complete documentation of historical and exam elements from today's encounter can be found in the full encounter summary report (not reduplicated in this progress note).  I " personally obtained the chief complaint(s) and history of present illness.  I confirmed and edited as necessary the review of systems, past medical/surgical history, family history, social history, and examination findings as documented by others; and I examined the patient myself.  I personally reviewed the relevant tests, images, and reports as documented above.  I formulated and edited as necessary the assessment and plan and discussed the findings and management plan with the patient and family. - Lorri Guadalupe MD 5/31/2018 2:10 AM         Thank you for the opportunity to participate in Lauro's care. If you would like to discuss anything further, please do not hesitate to contact me. I have asked Lauro to return in about 3 months (around 8/17/2018).    Sincerely,    Lorri Guadalupe MD    CC  Lauro ANKIT Stephen  [address] [address]  [address]       Base Eye Exam     Visual Acuity (Snellen - Linear)      Right Left   Dist sc 20/20 -2 20/20         Pupils      Pupils APD   Right PERRL None   Left PERRL None         Visual Fields      Left Right   Result Full Full         Neuro/Psych     Oriented x3:  Yes    Mood/Affect:  Normal            Additional Tests     Stereo     Fly:  -    Circles:  2/9    Correct reversal             Strabismus Exam        Method:  Alternate cover Distance Near Near +3.00DS Near Bifocals     Correction:  sc   E(T)' 10                       0 0 0    0 0 0    R Tilt                           - trace  - trace  RET 15-20 - trace  - trace                        L Tilt       0 0 0    0 0 0            DVD:      DVD:           Nystagmus:  None       AHP:  None                 No diplopia at near, single with 15 NIKKO at distance   Feeling light headed during testing    Slit Lamp and Fundus Exam     External Exam      Right Left    External Normal Normal      Slit Lamp Exam      Right Left    Lids/Lashes Normal Normal    Conjunctiva/Sclera milld nasal grzegorz milld nasal  grzegorz    Cornea Clear Clear    Anterior Chamber Deep and quiet Deep and quiet    Iris Round and reactive Round and reactive    Lens Clear Clear    Vitreous Normal Normal

## 2018-05-23 PROBLEM — H50.05 ALTERNATING ESOTROPIA: Status: ACTIVE | Noted: 2018-05-23

## 2018-05-23 PROBLEM — H53.2 DIPLOPIA: Status: ACTIVE | Noted: 2018-05-23

## 2018-05-23 PROBLEM — H50.21 HYPERTROPIA OF RIGHT EYE: Status: ACTIVE | Noted: 2018-05-23

## 2018-05-31 ASSESSMENT — EXTERNAL EXAM - RIGHT EYE: OD_EXAM: NORMAL

## 2018-05-31 ASSESSMENT — EXTERNAL EXAM - LEFT EYE: OS_EXAM: NORMAL

## 2018-05-31 ASSESSMENT — SLIT LAMP EXAM - LIDS
COMMENTS: NORMAL
COMMENTS: NORMAL

## 2018-05-31 NOTE — PROGRESS NOTES
"Chief Complaints and History of Present Illnesses   Patient presents with     Post Op (Ophthalmology) Both Eyes     some nausea after surgery none today, slept well last night, + pain with eye movements, + horizontal diplopia at distance only, ET noticed only at distace - much improved, no VA changes    Review of systems for the eyes was negative other than the pertinent positives and negatives noted in the HPI.  History is obtained from the patient and mother.    Referring provider: Saúl Bello     Primary care: No Ref-Primary, Physician   Assessment   Lauro Stephen is a 22 year old male who presents with:       ICD-10-CM    1. 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 Diplopia H53.2          Plan  Lauro is POD #1 s/p BMRc 5.5 millimeters.  He has undercorrection at distance but can fuse at near.  Call with increasing pain, lid swelling and redness, and discharge.  Tobradex ointment BID x 1 week.  F/u 2-3 months, try to coordinate with neurosurgery appointment for Chiari malformation found on pre-op MRI.       Further details of the management plan can be found in the \"Patient Instructions\" section which was printed and given to the patient at checkout.  Return in about 3 months (around 8/17/2018).   Attending Physician Attestation:  Complete documentation of historical and exam elements from today's encounter can be found in the full encounter summary report (not reduplicated in this progress note).  I personally obtained the chief complaint(s) and history of present illness.  I confirmed and edited as necessary the review of systems, past medical/surgical history, family history, social history, and examination findings as documented by others; and I examined the patient myself.  I personally reviewed the relevant tests, images, and reports as documented above.  I formulated and edited as necessary the assessment and plan and discussed the findings and management plan with the patient and family. - Lorri ARTHUR" MD Collins 5/31/2018 2:10 AM

## 2018-06-01 ENCOUNTER — TELEPHONE (OUTPATIENT)
Dept: OPHTHALMOLOGY | Facility: CLINIC | Age: 23
End: 2018-06-01

## 2018-06-01 DIAGNOSIS — H50.05 ALTERNATING ESOTROPIA: ICD-10-CM

## 2018-06-01 DIAGNOSIS — Z48.810 AFTERCARE FOLLOWING SURGERY OF A SENSORY ORGAN: Primary | ICD-10-CM

## 2018-06-01 NOTE — TELEPHONE ENCOUNTER
Patient is s/p [facility] both eyes 5/16/18 with Dr. Guadalupe. He called to ask if he can fly. His prop MRI showed [facility] Chiari malformation and I recommended a f/u with neurosurgery prior to flying. I explained to him that there is no ocular contraindication to flying.    He voiced understanding.

## 2018-08-07 ENCOUNTER — OFFICE VISIT (OUTPATIENT)
Dept: NEUROSURGERY | Facility: CLINIC | Age: 23
End: 2018-08-07

## 2018-08-07 VITALS
HEART RATE: 84 BPM | BODY MASS INDEX: 21.86 KG/M2 | TEMPERATURE: 98.3 F | HEIGHT: 66 IN | SYSTOLIC BLOOD PRESSURE: 128 MMHG | RESPIRATION RATE: 17 BRPM | WEIGHT: 136 LBS | OXYGEN SATURATION: 99 % | DIASTOLIC BLOOD PRESSURE: 76 MMHG

## 2018-08-07 DIAGNOSIS — G93.5 COMPRESSION OF BRAIN (H): Primary | ICD-10-CM

## 2018-08-07 ASSESSMENT — ENCOUNTER SYMPTOMS
EYE IRRITATION: 0
EYE WATERING: 0
DOUBLE VISION: 1
EYE REDNESS: 0
EYE PAIN: 0

## 2018-08-07 ASSESSMENT — PAIN SCALES - GENERAL: PAINLEVEL: NO PAIN (0)

## 2018-08-07 NOTE — LETTER
8/7/2018       RE: Lauro Stephen  [address] [facility] [address]     Dear Colleague,    Thank you for referring your patient, Lauro Stephen, to the [facility] NEUROSURGERY at [facility]. Please see a copy of my visit note below.      Again, thank you for allowing me to participate in the care of your patient.      Sincerely,    Tyler Jc MD

## 2018-08-07 NOTE — NURSING NOTE
Chief Complaint   Patient presents with     Consult     [facility] NEW, CHIARI MALFORMATION     Randy Guadalupe, EMT

## 2018-08-07 NOTE — MR AVS SNAPSHOT
"              After Visit Summary   8/7/2018    Lauro Stephen    MRN: [id number]           Patient Information     Date Of Birth          1995        Visit Information        Provider Department      8/7/2018 1:30 PM Tyler Jc MD [facility] Neurosurgery        Today's Diagnoses     Compression of brain (H)    -  1       Follow-ups after your visit        Your next 10 appointments already scheduled     Sep 20, 2018  2:40 PM CDT   Return Adult Strabismus with Lorri Guadalupe MD   P Peds Eye General ([facility])    [address]  [address] [address] [phone]              Who to contact     Please call your clinic at [phone] to:    Ask questions about your health    Make or cancel appointments    Discuss your medicines    Learn about your test results    Speak to your doctor            Additional Information About Your Visit        Care EveryWhere ID     This is your Care EveryWhere ID. This could be used by other organizations to access your [facility] medical records  [id number]        Your Vitals Were     Pulse Temperature Respirations Height Pulse Oximetry BMI (Body Mass Index)    84 98.3  F (36.8  C) (Oral) 17 1.688 m (5' 6.45\") 99% 21.65 kg/m2       Blood Pressure from Last 3 Encounters:   08/07/18 128/76   05/16/18 115/67    Weight from Last 3 Encounters:   08/07/18 61.7 kg (136 lb)   05/16/18 62 kg (136 lb 11 oz)              Today, you had the following     No orders found for display       Primary Care Provider Office Phone # Fax #    Rosangela GUERRA Rose [phone]       [address] [address]        Equal Access to Services     [facility]: Hadii joel [place], figueroa blevins. [facility] [phone].    ATENCIÓN: Si habla español, tiene a rose disposición servicios gratuitos de asistencia " lingüísticaRigoberto Duarte al [phone].    We comply with applicable federal civil rights laws and [place] laws. We do not discriminate on the basis of race, color, national origin, age, disability, sex, sexual orientation, or gender identity.            Thank you!     Thank you for choosing [facility]  for your care. Our goal is always to provide you with excellent care. Hearing back from our patients is one way we can continue to improve our services. Please take a few minutes to complete the written survey that you may receive in the mail after your visit with us. Thank you!             Your Updated Medication List - Protect others around you: Learn how to safely use, store and throw away your medicines at www.disposemymeds.org.      Notice  As of 8/7/2018 11:59 PM    You have not been prescribed any medications.

## 2018-08-08 NOTE — OP NOTE
Procedure Date: 08/07/2018      Dear Dr. Rose:      It was my pleasure to see Mr. Stephen today in our clinic.      As you know, Mr. Stephen is a 22-year-old male with recent history of esotropia and diplopia with viewing at a distance and worsened bilateral gaze, now status post ophthalmologic surgery for strabismus in May.  He presents to our clinic today for followup of routine postoperative MRI which demonstrated cerebellar tonsillar herniation.      Overall, Mr. Stephen is a medically well young man with no current complaints.  He denies blurry vision, nausea, vomiting, difficulties with gait or imbalance, and difficulties with fine motor movements.      He does state that he has continued to experience some diplopia when viewing objects greater than approximately 40 feet away, at his best.  At his worst, when he is tired or at the end of the day, he states that he experiences diplopia when viewing objects greater than 5 or 6 feet away.  His diplopia is worsened by lateral gaze.  Of note, he also typically wears corrective lenses, although he has not worn them for the past several weeks due to changes in his vision in the postoperative period.      PHYSICAL EXAMINATION.   GENERAL:  A well-appearing young man, appearing his stated age, seated comfortably in examination room, pleasantly conversant.     HEENT:  Normocephalic, no gross abnormalities.   NEUROLOGIC:     Mental status:  Alert and oriented to name, location and time.  No overt speech abnormalities.     Cranial nerves:  Cranial nerves II through V and VII through XII intact.  Esotropia evident on examination with lateral gaze bilaterally beginning at approximately 45 degrees from forward.  The patient denies any changes in his senses of hearing, taste or smell.   Motor:  No weakness or shoulder shrug, equal bilaterally.  5/5 strength in the bilateral upper extremities.  5/5 strength in the bilateral lower extremities.     Sensation:  Intact to light touch in  all 3 distributions of the trigeminal nerve, the bilateral upper extremities, and the bilateral lower extremities.     Reflexes:  2+ bilaterally in the biceps, triceps and brachioradialis.  2+ bilaterally in the patellar and ankle.  Cecy sign negative.  No ankle clonus.     Cerebellar:  No abnormalities in rapid alternating movements, no slowing on rapid finger tapping, no finger-nose dysmetria.  Romberg negative.     Gait:  Normal stride gait in tandem.  No imbalance on heel to toe.  No overt abnormalities on tiptoe or heel gait.        IMAGING:  MRI of the brain and orbits dated 05/15/2018:  Low-lying cerebellar tonsils measuring up to 5 mm below Satya line at the foramen magnum.  No orbital mass or abnormal enhancement.  Medial deviation of the left globe.        ASSESSMENT:     1.  Asymptomatic mild cerebellar tonsillar herniation with no abnormalities on neurologic exam.     2.  Diplopia and esotropia consistent with previous findings documented by Ophthalmology, improved since strabismus surgery in May.        PLAN:     1.  No further neurosurgical workup warranted given Mr. Stephen's lack of symptoms and lack of abnormalities on neurologic exam, and lack of tonsillar or brainstem compression on MRI.     2.  Continued outpatient postoperative followup with Ophthalmology given surgery 3 months ago.      Again, it was my pleasure to see Mr. Stephen in our clinic today.      Please feel free to call our clinic at any time if any further issues, questions or concerns arise.      Dr. Francis spent approximately 25 minutes in conversation with the patient during this encounter, the majority of which was spent discussing further planning and workup for Mr. Stephen.         MARILY FRANCIS MD       As dictated by IDRIS LENNON MD       Agree with resident's note.  Seen and examined today with team.  Marily Francis         D: 08/07/2018   T: 08/08/2018   MT: LEYDA      Name:     BRITTANY STEPHEN   MRN:      [id number]         Account:        [id number]   :      1995           Procedure Date: 2018      Document: [id number]

## 2018-09-20 ENCOUNTER — OFFICE VISIT (OUTPATIENT)
Dept: OPHTHALMOLOGY | Facility: CLINIC | Age: 23
End: 2018-09-20
Attending: OPHTHALMOLOGY
Payer: COMMERCIAL

## 2018-09-20 DIAGNOSIS — H50.32 INTERMITTENT ESOTROPIA, ALTERNATING: Primary | ICD-10-CM

## 2018-09-20 DIAGNOSIS — H53.2 DIPLOPIA: ICD-10-CM

## 2018-09-20 DIAGNOSIS — G93.5 CHIARI I MALFORMATION (H): ICD-10-CM

## 2018-09-20 PROCEDURE — G0463 HOSPITAL OUTPT CLINIC VISIT: HCPCS | Mod: ZF | Performed by: TECHNICIAN/TECHNOLOGIST

## 2018-09-20 PROCEDURE — 92060 SENSORIMOTOR EXAMINATION: CPT | Mod: ZF | Performed by: OPHTHALMOLOGY

## 2018-09-20 ASSESSMENT — VISUAL ACUITY
OD_SC: 20/15
OS_SC: 20/15
METHOD: SNELLEN - LINEAR

## 2018-09-20 NOTE — MR AVS SNAPSHOT
After Visit Summary   9/20/2018    Lauro Stephen    MRN: [id number]           Patient Information     Date Of Birth          1995        Visit Information        Provider Department      9/20/2018 2:40 PM Lorri Guadalupe MD [facility] Eye General        Today's Diagnoses     Intermittent esotropia, alternating    -  1    Diplopia        Chiari I malformation (H)           Follow-ups after your visit        Follow-up notes from your care team     Return if symptoms worsen or fail to improve.      Who to contact     Please call your clinic at [phone] to:    Ask questions about your health    Make or cancel appointments    Discuss your medicines    Learn about your test results    Speak to your doctor            Additional Information About Your Visit        Care EveryWhere ID     This is your Care EveryWhere ID. This could be used by other organizations to access your [facility] medical records  [id number]         Blood Pressure from Last 3 Encounters:   08/07/18 128/76   05/16/18 115/67    Weight from Last 3 Encounters:   08/07/18 61.7 kg (136 lb)   05/16/18 62 kg (136 lb 11 oz)              We Performed the Following     Sensorimotor        Primary Care Provider Office Phone # Fax #    Rosangela Rose [phone]       [place] [address]        Equal Access to Services     LEI EDWARDS : Hadii avelino ku hadasho Soomaali, waaxda luqadaha, qaybta kaalmada adeegyada, figueroa wood hayorin orosco. So [facility] [phone].    ATENCIÓN: Si habla español, tiene a rose disposición servicios gratuitos de asistencia lingüística. Llame al [phone].    We comply with applicable federal civil rights laws and [place] laws. We do not discriminate on the basis of race, color, national origin, age, disability, sex, sexual orientation, or gender identity.            Thank you!     Thank you for choosing [facility] EYE GENERAL  for  your care. Our goal is always to provide you with excellent care. Hearing back from our patients is one way we can continue to improve our services. Please take a few minutes to complete the written survey that you may receive in the mail after your visit with us. Thank you!             Your Updated Medication List - Protect others around you: Learn how to safely use, store and throw away your medicines at www.disposemymeds.org.      Notice  As of 9/20/2018 11:59 PM    You have not been prescribed any medications.

## 2018-09-20 NOTE — NURSING NOTE
Chief Complaint   Patient presents with     Esotropia Follow Up     diplopia at distance noticed, diplopia depends on amount of sleep, no diplopia at near, no VA changes noticed, no ET noticed, no AHP      HPI    Informant(s):  patient    Affected eye(s):  Both   Symptoms:

## 2018-09-21 PROBLEM — H50.32 INTERMITTENT ESOTROPIA, ALTERNATING: Status: ACTIVE | Noted: 2018-05-23

## 2018-09-21 PROBLEM — G93.5 CHIARI I MALFORMATION (H): Status: ACTIVE | Noted: 2018-09-21

## 2018-09-21 ASSESSMENT — SLIT LAMP EXAM - LIDS
COMMENTS: NORMAL
COMMENTS: NORMAL

## 2018-09-21 ASSESSMENT — EXTERNAL EXAM - LEFT EYE: OS_EXAM: NORMAL

## 2018-09-21 ASSESSMENT — EXTERNAL EXAM - RIGHT EYE: OD_EXAM: NORMAL

## 2018-09-21 NOTE — PROGRESS NOTES
"Chief Complaints and History of Present Illnesses   Patient presents with     Esotropia Follow Up     diplopia at distance noticed, diplopia depends on amount of sleep, no diplopia at near, no VA changes noticed, no ET noticed, no AHP    Review of systems for the eyes was negative other than the pertinent positives and negatives noted in the HPI.  History is obtained from the patient    Referring provider: Lorri Guadalupe     Primary care: Rosangela Rose   Assessment   Lauro Stephen is a 22 year old male who presents with:       ICD-10-CM    1. Intermittent esotropia, alternating H50.32 Sensorimotor   2. Diplopia H53.2    3. Chiari I malformation (H) G93.5          Plan  Lauro has greatly improved alignment after BMRc 5.5.  He still has intermittent small angle esotropia and diplopia--especially at distance.  Discussed option of BLRs if diplopia symptoms get bothersome.  He will f/u PRN.       Further details of the management plan can be found in the \"Patient Instructions\" section which was printed and given to the patient at checkout.  Return if symptoms worsen or fail to improve.   Attending Physician Attestation:  Complete documentation of historical and exam elements from today's encounter can be found in the full encounter summary report (not reduplicated in this progress note).  I personally obtained the chief complaint(s) and history of present illness.  I confirmed and edited as necessary the review of systems, past medical/surgical history, family history, social history, and examination findings as documented by others; and I examined the patient myself.  I personally reviewed the relevant tests, images, and reports as documented above.  I formulated and edited as necessary the assessment and plan and discussed the findings and management plan with the patient and family. - Lorri Guadalupe MD 9/21/2018 9:58 AM      "

## 2020-11-12 ENCOUNTER — E-VISIT (OUTPATIENT)
Dept: URGENT CARE | Facility: URGENT CARE | Age: 25
End: 2020-11-12
Payer: COMMERCIAL

## 2020-11-12 DIAGNOSIS — Z20.822 CLOSE EXPOSURE TO 2019 NOVEL CORONAVIRUS: Primary | ICD-10-CM

## 2020-11-12 PROCEDURE — 99421 OL DIG E/M SVC 5-10 MIN: CPT | Performed by: PHYSICIAN ASSISTANT

## 2020-11-12 NOTE — PATIENT INSTRUCTIONS
Dear Lauro Stephen,    Based on your exposure to COVID-19 (coronavirus), we would like to test you for this virus. I have placed an order for this test and please call [phone] to schedule testing. [employer] employees please call [phone].  [place] ([employer] employees call [phone]. The optimal time to test after exposure is 5-7 days from the exposure.    If you know you have had close contact with someone who tested positive, you should be quarantined for 14 days after this exposure. You should stay in quarantine for the14 days even if the covid test is negative.     Quarantine means:  Stay home and away from others. Don't go to school or anywhere else. Generally quarantine means staying home from work but there are some exceptions to this. Please contact your workplace.  No hugging, kissing or shaking hands.  Don't let anyone visit.  Cover your mouth and nose with a mask, tissue or washcloth to avoid spreading germs.  Wash your hands and face often. Use soap and water.    What are the symptoms of COVID-19?  The most common symptoms are cough, fever and trouble breathing. Less common symptoms include headache, body aches, fatigue (feeling very tired), chills, sore throat, stuffy or runny nose, diarrhea (loose poop), loss of taste or smell, belly pain, and nausea or vomiting (feeling sick to your stomach or throwing up).  After 14 days, if you have still don't have symptoms, you likely don't have this virus.  If you develop symptoms, follow these guidelines.  If you're normally healthy: Please start another eVisit.  If you have a serious health problem (like cancer, heart failure, an organ transplant or kidney disease): Call your specialty clinic. Let them know that you might have COVID-19.    When it's time for your COVID test:  Stay at least 6 feet away from others. (If someone will drive you to your test, stay in the backseat, as far away from the  as you can.)  Cover your mouth and nose  with a mask, tissue or washcloth.  Go straight to the testing site. Don't make any stops on the way there or back.    Please note  Patients in these groups are at risk for severe illness due to COVID-19:    People 65 years and older    People who live in a nursing home or long-term care facility    People with chronic disease (lung, heart, cancer, diabetes, kidney, liver, immunologic)    People who have a weakened immune system, including those who:  o Are in cancer treatment  o Take medicine that weakens the immune system, such as corticosteroids  o Had a bone marrow or organ transplant  o Have an immune deficiency  o Have poorly controlled HIV or AIDS  o Are obese (body mass index of 40 or higher)  o Smoke regularly    Where can I get more information?   [facility] - About COVID-19: [email or web address]  CDC - What to Do If You're Sick: www.cdc.gov/coronavirus/2019-ncov/about/steps-when-sick.html  CDC - Ending Home Isolation: www.cdc.gov/coronavirus/2019-ncov/hcp/disposition-in-home-patients.html  CDC - Caring for Someone: www.cdc.gov/coronavirus/2019-ncov/if-you-are-sick/care-for-someone.html  [facility] - Interim Guidance for Hospital Discharge to Home: www.health.[facility].[place]/diseases/coronavirus/hcp/hospdischarge.pdf  [facility] clinical trials (COVID-19 research studies): clinicalaffairs.[facility]-clinical-trials  Below are the COVID-19 hotlines at the [facility] of Health ([facility]). Interpreters are available.  For health questions: Call 881-098-0679 or 1-775.650.3299 (7 a.m. to 7 p.m.)  For questions about schools and childcare: Call 160-284-7904 or 1-885.801.5238 (7 a.m. to 7 p.m.)

## 2020-11-29 ENCOUNTER — E-VISIT (OUTPATIENT)
Dept: URGENT CARE | Facility: URGENT CARE | Age: 25
End: 2020-11-29

## 2020-11-29 DIAGNOSIS — Z20.822 CLOSE EXPOSURE TO 2019 NOVEL CORONAVIRUS: Primary | ICD-10-CM

## 2020-11-29 PROCEDURE — 99421 OL DIG E/M SVC 5-10 MIN: CPT | Performed by: EMERGENCY MEDICINE

## 2020-11-29 NOTE — PATIENT INSTRUCTIONS
Dear Lauro Stephen,    Based on your exposure to COVID-19 (coronavirus), we would like to test you for this virus. I have placed an order for this test.    For all employees or close contacts (except [employer] [place] and [employer] - see below), go to your [employer] home page and scroll down to the section that says  You have an appointment that needs to be scheduled  and click the large green button that says  Schedule Now  and follow the steps to find the next available opening.     If you are unable to complete these steps or if you cannot find any available times, please call [phone] to schedule employee testing.       [employer] [place] employees or close contacts, please call [phone].   [place] ([employer]) employees or close contacts call [phone].      If you know you have had close contact with someone who tested positive, you should be quarantined for 14 days after this exposure. You should stay in quarantine for the14 days even if the covid test is negative.     Quarantine means:  Stay home and away from others. Don't go to school or anywhere else. Generally quarantine means staying home from work but there are some exceptions to this. Please contact your workplace.  No hugging, kissing or shaking hands.  Don't let anyone visit.  Cover your mouth and nose with a mask, tissue or washcloth to avoid spreading germs.  Wash your hands and face often. Use soap and water.    What are the symptoms of COVID-19?  The most common symptoms are cough, fever and trouble breathing. Less common symptoms include headache, body aches, fatigue (feeling very tired), chills, sore throat, stuffy or runny nose, diarrhea (loose poop), loss of taste or smell, belly pain, and nausea or vomiting (feeling sick to your stomach or throwing up).  After 14 days, if you have still don't have symptoms, you likely don't have this virus.  If you develop symptoms, follow these guidelines.  If you're normally healthy: Please start another  eVisit.  If you have a serious health problem (like cancer, heart failure, an organ transplant or kidney disease): Call your specialty clinic. Let them know that you might have COVID-19.    When it's time for your COVID test:  Stay at least 6 feet away from others. (If someone will drive you to your test, stay in the backseat, as far away from the  as you can.)  Cover your mouth and nose with a mask, tissue or washcloth.  Go straight to the testing site. Don't make any stops on the way there or back.    Please note  Patients in these groups are at risk for severe illness due to COVID-19:    People 65 years and older    People who live in a nursing home or long-term care facility    People with chronic disease (lung, heart, cancer, diabetes, kidney, liver, immunologic)    People who have a weakened immune system, including those who:  o Are in cancer treatment  o Take medicine that weakens the immune system, such as corticosteroids  o Had a bone marrow or organ transplant  o Have an immune deficiency  o Have poorly controlled HIV or AIDS  o Are obese (body mass index of 40 or higher)  o Smoke regularly    Where can I get more information?  [facility] - About COVID-19: [email or web address]  CDC - What to Do If You're Sick: www.cdc.gov/coronavirus/2019-ncov/about/steps-when-sick.html  CDC - Ending Home Isolation: www.cdc.gov/coronavirus/2019-ncov/hcp/disposition-in-home-patients.html  CDC - Caring for Someone: www.cdc.gov/coronavirus/2019-ncov/if-you-are-sick/care-for-someone.html  [facility] - Interim Guidance for Hospital Discharge to Home: www.health.[facility].[place]/diseases/coronavirus/hcp/hospdischarge.pdf  [facility] clinical trials (COVID-19 research studies): clinicalaffairs.[facility]/n-clinical-trials  Below are the COVID-19 hotlines at the [place] Department of Health ([facility]). Interpreters are available.  For health questions: Call 560-439-6529 or 1-813.451.8324 (7 a.m. to 7  p.m.)  For questions about schools and childcare: Call 859-944-4847 or 1-746.111.6554 (7 a.m. to 7 p.m.)

## 2020-12-02 DIAGNOSIS — Z20.822 CLOSE EXPOSURE TO 2019 NOVEL CORONAVIRUS: ICD-10-CM

## 2020-12-02 PROCEDURE — U0003 INFECTIOUS AGENT DETECTION BY NUCLEIC ACID (DNA OR RNA); SEVERE ACUTE RESPIRATORY SYNDROME CORONAVIRUS 2 (SARS-COV-2) (CORONAVIRUS DISEASE [COVID-19]), AMPLIFIED PROBE TECHNIQUE, MAKING USE OF HIGH THROUGHPUT TECHNOLOGIES AS DESCRIBED BY CMS-2020-01-R: HCPCS | Performed by: EMERGENCY MEDICINE

## 2020-12-03 LAB
SARS-COV-2 RNA SPEC QL NAA+PROBE: NOT DETECTED
SPECIMEN SOURCE: NORMAL

## 2021-01-03 ENCOUNTER — HEALTH MAINTENANCE LETTER (OUTPATIENT)
Age: 26
End: 2021-01-03

## 2021-01-26 ENCOUNTER — OFFICE VISIT (OUTPATIENT)
Dept: FAMILY MEDICINE | Facility: CLINIC | Age: 26
End: 2021-01-26
Payer: COMMERCIAL

## 2021-01-26 VITALS
OXYGEN SATURATION: 98 % | HEART RATE: 78 BPM | DIASTOLIC BLOOD PRESSURE: 76 MMHG | BODY MASS INDEX: 22.99 KG/M2 | SYSTOLIC BLOOD PRESSURE: 112 MMHG | WEIGHT: 146.5 LBS | RESPIRATION RATE: 16 BRPM | HEIGHT: 67 IN | TEMPERATURE: 98.2 F

## 2021-01-26 DIAGNOSIS — L72.0 INFECTED EPIDERMOID CYST: Primary | ICD-10-CM

## 2021-01-26 DIAGNOSIS — L08.9 INFECTED EPIDERMOID CYST: Primary | ICD-10-CM

## 2021-01-26 PROCEDURE — 99203 OFFICE O/P NEW LOW 30 MIN: CPT | Performed by: NURSE PRACTITIONER

## 2021-01-26 RX ORDER — CEPHALEXIN 500 MG/1
500 CAPSULE ORAL 3 TIMES DAILY
Qty: 7 CAPSULE | Refills: 0 | Status: SHIPPED | OUTPATIENT
Start: 2021-01-26 | End: 2021-12-14

## 2021-01-26 ASSESSMENT — MIFFLIN-ST. JEOR: SCORE: 1610.21

## 2021-01-26 NOTE — PROGRESS NOTES
"  Assessment & Plan     Infected epidermoid cyst  Increase in size and redness recently; will cover with course of antibiotics.  Can follow-up for removal in the next couple weeks.   - cephALEXin (KEFLEX) 500 MG capsule; Take 1 capsule (500 mg) by mouth 3 times daily      Return in about 2 weeks (around 2/9/2021) for cyst removal .    TIEN Thompson CNP  [facility] ZAINA Schmidt is a 25 year old who presents to clinic today for the following health issues     HPI       Rash  Onset/Duration: Ongoing for 3 to 4 Years  Description  Location: Right Shoulder Blade  Character: Cyst Appearance-Denies Drainage  Itching: no  Intensity:  Mild - Feels currently inflamed  Progression of Symptoms:  same  Accompanying signs and symptoms:   Fever: no  Body aches or joint pain: no  Sore throat symptoms: no  Recent cold symptoms: no  History:           Previous episodes of similar rash: YES-Has had issues in the same exact spot.   New exposures:  None  Recent travel: no  Exposure to similar rash: no  Precipitating or alleviating factors: Touching/Squeezing caused pain.   Therapies tried and outcome: Advil/Tylenol tried for pain/inflammation     Has tried squeezing it and only once was able to excrete something out.  Now bump seems inflamed.        Review of Systems   CONSTITUTIONAL:NEGATIVE  for fever  INTEGUMENTARY/SKIN: POSITIVE for skin lesion       Objective    /76 (BP Location: Right arm, Patient Position: Chair, Cuff Size: Adult Regular)   Pulse 78   Temp 98.2  F (36.8  C) (Oral)   Resp 16   Ht 1.689 m (5' 6.5\")   Wt 66.5 kg (146 lb 8 oz)   SpO2 98%   BMI 23.29 kg/m    Body mass index is 23.29 kg/m .  Physical Exam   GENERAL: healthy, alert and no distress  SKIN: R suprascapular 3 cm erythematous nodule, slightly tender, no warmth or induration              "

## 2021-02-08 ENCOUNTER — OFFICE VISIT (OUTPATIENT)
Dept: FAMILY MEDICINE | Facility: CLINIC | Age: 26
End: 2021-02-08
Payer: COMMERCIAL

## 2021-02-08 VITALS
WEIGHT: 145 LBS | TEMPERATURE: 97.6 F | HEART RATE: 91 BPM | RESPIRATION RATE: 16 BRPM | BODY MASS INDEX: 23.05 KG/M2 | DIASTOLIC BLOOD PRESSURE: 70 MMHG | SYSTOLIC BLOOD PRESSURE: 92 MMHG

## 2021-02-08 DIAGNOSIS — L02.219 CELLULITIS AND ABSCESS OF TRUNK: Primary | ICD-10-CM

## 2021-02-08 DIAGNOSIS — L03.319 CELLULITIS AND ABSCESS OF TRUNK: Primary | ICD-10-CM

## 2021-02-08 PROCEDURE — 11402 EXC TR-EXT B9+MARG 1.1-2 CM: CPT | Performed by: FAMILY MEDICINE

## 2021-02-08 ASSESSMENT — ENCOUNTER SYMPTOMS
ROS SKIN COMMENTS: ABSCESS
FEVER: 0

## 2021-02-08 NOTE — PROGRESS NOTES
"  Assessment and Plan    (L03.319,  L02.219) Cellulitis and abscess of trunk  (primary encounter diagnosis)  Comment: Verbal consent obtained.  Area prepped with betadine.  Anesthesia with 1% lidocaine with epi.  Incision into abscess made with #11 blade.  Contents expressed and space thoroughly probed.  Packed with 1/4\" packing.  Dry dressing applied.  Patient tolerated procedure well.    Plan: EXC BENIGN SKIN LESION TRUNK/ARM/LEG 1.1-2.0 CM              RTC in 3d for packing change with RN    Vince Andrade MD      Subjective     Brayan is a 25 year old who presents to clinic today for the following health issues     HPI     Patient is here to have a cyst removed from the right shoulder blade area.    Seen by Rita Rodriguez NP about 2 weeks ago for inflamed cyst.  Had been present but not bothersome for several years.  Rita started him on antibiotic and arranged for him to return for removal.  He reports that several days after being seen previously cyst began to drain and has been draining since.  Is much less inflamed and is not currently painful or tender.  No fevers.     Review of Systems   Constitutional: Negative for fever.   Skin:        abscess            Objective    There were no vitals taken for this visit.  There is no height or weight on file to calculate BMI.  Physical Exam  Musculoskeletal:        Back:                         "

## 2021-02-11 ENCOUNTER — ALLIED HEALTH/NURSE VISIT (OUTPATIENT)
Dept: NURSING | Facility: CLINIC | Age: 26
End: 2021-02-11
Payer: COMMERCIAL

## 2021-02-11 DIAGNOSIS — Z48.00 ENCOUNTER FOR CHANGE OF DRESSING: Primary | ICD-10-CM

## 2021-02-11 PROCEDURE — 99207 PR NO CHARGE NURSE ONLY: CPT

## 2021-02-12 NOTE — PROGRESS NOTES
"Lauro Stephen is a 25 year old male who presents today for a dressing change.  Patient had a cyst removed on the back of his right shoulder 2/8/2021, it appears healing, no sign of infection with minimal amount of Serosanguinous drainage.    Patient complains of dull pain at the site. Rating on the pain scale is 1.    Wound Cares provided were:  removal of existing dressing  visual inspection  cleansing with water and betadine solution  irrigation with water solution  wound packing with 1/4\" plain packing strip about 3\" long  application of clean dressing    Patient advised to return in 3-4 days for recheck.  Appointment scheduled.    Winsome Anthony RN    "

## 2021-02-16 ENCOUNTER — ALLIED HEALTH/NURSE VISIT (OUTPATIENT)
Dept: NURSING | Facility: CLINIC | Age: 26
End: 2021-02-16
Payer: COMMERCIAL

## 2021-02-16 DIAGNOSIS — Z48.00 ENCOUNTER FOR CHANGE OF DRESSING: Primary | ICD-10-CM

## 2021-02-16 PROCEDURE — 99207 PR NO CHARGE NURSE ONLY: CPT

## 2021-02-16 NOTE — PROGRESS NOTES
Lauro Stephen is a 25 year old male who presents today for a dressing change.   Patient had a cyst removed on the back of his right shoulder 2/8/2021. It appears to be healing with minimal amount of Serosanguinous drainage.    Patient complains of dull pain at the site. Rating on the pain scale is 0.    Wound Cares provided were:  removal of existing dressing  visual inspection  cleansing with Hibiclens solution  irrigation with normal saline solution  application of clean dressing    Advised patient to keep area clean and dry for the next 24-48 hours.  To return if not improving or worsening.    Winsome Anthony RN

## 2021-10-10 ENCOUNTER — HEALTH MAINTENANCE LETTER (OUTPATIENT)
Age: 26
End: 2021-10-10

## 2021-12-08 ENCOUNTER — LAB REQUISITION (OUTPATIENT)
Dept: LAB | Facility: CLINIC | Age: 26
End: 2021-12-08

## 2021-12-08 PROCEDURE — 86481 TB AG RESPONSE T-CELL SUSP: CPT | Performed by: INTERNAL MEDICINE

## 2021-12-08 PROCEDURE — 36415 COLL VENOUS BLD VENIPUNCTURE: CPT | Performed by: INTERNAL MEDICINE

## 2021-12-09 LAB
GAMMA INTERFERON BACKGROUND BLD IA-ACNC: 0.07 IU/ML
M TB IFN-G BLD-IMP: NEGATIVE
M TB IFN-G CD4+ BCKGRND COR BLD-ACNC: 9.93 IU/ML
MITOGEN IGNF BCKGRD COR BLD-ACNC: -0.05 IU/ML
MITOGEN IGNF BCKGRD COR BLD-ACNC: -0.05 IU/ML
QUANTIFERON MITOGEN: 10 IU/ML
QUANTIFERON NIL TUBE: 0.07 IU/ML
QUANTIFERON TB1 TUBE: 0.02 IU/ML
QUANTIFERON TB2 TUBE: 0.02

## 2021-12-14 ENCOUNTER — OFFICE VISIT (OUTPATIENT)
Dept: FAMILY MEDICINE | Facility: CLINIC | Age: 26
End: 2021-12-14
Payer: COMMERCIAL

## 2021-12-14 VITALS
HEART RATE: 85 BPM | DIASTOLIC BLOOD PRESSURE: 64 MMHG | BODY MASS INDEX: 23.86 KG/M2 | SYSTOLIC BLOOD PRESSURE: 114 MMHG | OXYGEN SATURATION: 99 % | WEIGHT: 150.1 LBS | RESPIRATION RATE: 16 BRPM | TEMPERATURE: 98.1 F

## 2021-12-14 DIAGNOSIS — M54.6 ACUTE RIGHT-SIDED THORACIC BACK PAIN: Primary | ICD-10-CM

## 2021-12-14 PROCEDURE — 99213 OFFICE O/P EST LOW 20 MIN: CPT | Performed by: FAMILY MEDICINE

## 2021-12-14 ASSESSMENT — ENCOUNTER SYMPTOMS
GASTROINTESTINAL NEGATIVE: 1
PARESTHESIAS: 0
CONSTITUTIONAL NEGATIVE: 1
BACK PAIN: 1
WEAKNESS: 0

## 2021-12-14 ASSESSMENT — PAIN SCALES - GENERAL: PAINLEVEL: SEVERE PAIN (6)

## 2021-12-14 NOTE — PROGRESS NOTES
"  Assessment and Plan    (M54.6) Acute right-sided thoracic back pain  (primary encounter diagnosis)  Comment: acute pain management as needed, seems to be improving  Plan: [facility]              RTC in 1y for CPE    Vince Andrade MD      Bobbi Schmidt is a 26 year old who presents for the following health issues     History of Present Illness       Back Pain:  He presents for follow up of back pain. Patient's back pain is a recurring problem.  Location of back pain:  Right middle of back and left middle of back  Description of back pain: sharp and stabbing  Back pain spreads: nowhere    Since patient first noticed back pain, pain is: always present, but gets better and worse  Does back pain interfere with his job:  Not applicable      He eats 2-3 servings of fruits and vegetables daily.He consumes 2 sweetened beverage(s) daily.He exercises with enough effort to increase his heart rate 20 to 29 minutes per day.  He exercises with enough effort to increase his heart rate 6 days per week.   He is taking medications regularly.       PATIENT EXPERIENCED A BACK INJURY IN MAY. WAS NOT EVALUATED AT THAT TIME. ORIGINALLY HEALED ON ITS OWN BUT TWEAKED  IT ON Sunday CARRYING A SUITCASE. FEELS STRAINED. IT IS PAINFUL WHEN HE \"ROTATES\" YESTERDAY HE RATED HIS PAIN AN 9/10. TODAY HE IS RATING IT A 6/10     Tweaked his mid R back about three days ago carrying a heavy suitcase up the stairs.  Hurts to twist, standing up or sitting down (change of posture).  No pain sitting or laying on his back.  Feels similar to an injury that he had back in May.  Ibuprofen is helpful.  Is feeling a bit better today.  Notes that the several days before he had been in [place] skiing.  Did not have any injuries while skiing, but notes that that was his first time skiing for the year.    Review of Systems   Constitutional: Negative.    Gastrointestinal: Negative.    Genitourinary: Negative.    Musculoskeletal: Positive for back pain. "   Neurological: Negative for weakness and paresthesias.            Objective    /64 (BP Location: Right arm, Patient Position: Sitting, Cuff Size: Adult Regular)   Pulse 85   Temp 98.1  F (36.7  C) (Oral)   Resp 16   Wt 68.1 kg (150 lb 1.6 oz)   SpO2 99%   BMI 23.86 kg/m    Body mass index is 23.86 kg/m .  Physical Exam  Vitals reviewed.   Constitutional:       Appearance: Normal appearance.   Musculoskeletal:      Thoracic back: Tenderness present. No deformity, spasms or bony tenderness.   Neurological:      Mental Status: He is alert.

## 2021-12-14 NOTE — PROGRESS NOTES
"  {PROVIDER CHARTING PREFERENCE:204480}    Subjective   Brayan is a 26 year old who presents for the following health issues {ACCOMPANIED BY STATEMENT (Optional):337331}    History of Present Illness       Back Pain:  He presents for follow up of back pain. Patient's back pain is a recurring problem.  Location of back pain:  Right middle of back and left middle of back  Description of back pain: sharp and stabbing  Back pain spreads: nowhere    Since patient first noticed back pain, pain is: always present, but gets better and worse  Does back pain interfere with his job:  Not applicable      He eats 2-3 servings of fruits and vegetables daily.He consumes 2 sweetened beverage(s) daily.He exercises with enough effort to increase his heart rate 20 to 29 minutes per day.  He exercises with enough effort to increase his heart rate 6 days per week.   He is taking medications regularly.       Concern - ***  Onset: ***  Description: ***  Intensity: {.:659848}  Progression of Symptoms:  {.:303689}  Accompanying Signs & Symptoms: ***  Previous history of similar problem: ***  Precipitating factors:        Worsened by: ***  Alleviating factors:        Improved by: ***  Therapies tried and outcome: {NONE DEFAULTED:371669::\" none \"}    {additonal problems for provider to add (Optional):492244}    Review of Systems   {ROS COMP (Optional):109116}      Objective    There were no vitals taken for this visit.  There is no height or weight on file to calculate BMI.  Physical Exam   {Exam List (Optional):483356}    {Diagnostic Test Results (Optional):156206}    {AMBULATORY ATTESTATION (Optional):285216}        "

## 2021-12-22 ENCOUNTER — THERAPY VISIT (OUTPATIENT)
Dept: PHYSICAL THERAPY | Facility: CLINIC | Age: 26
End: 2021-12-22
Attending: FAMILY MEDICINE
Payer: COMMERCIAL

## 2021-12-22 DIAGNOSIS — M54.9 BACK PAIN: Primary | ICD-10-CM

## 2021-12-22 DIAGNOSIS — M54.6 ACUTE RIGHT-SIDED THORACIC BACK PAIN: ICD-10-CM

## 2021-12-22 PROCEDURE — 97161 PT EVAL LOW COMPLEX 20 MIN: CPT | Mod: GP | Performed by: PHYSICAL THERAPIST

## 2021-12-22 PROCEDURE — 97110 THERAPEUTIC EXERCISES: CPT | Mod: GP | Performed by: PHYSICAL THERAPIST

## 2021-12-22 NOTE — PROGRESS NOTES
Physical Therapy Initial Evaluation  Subjective:  The history is provided by the patient.   Patient Health History  Lauro Stephen being seen for Back Pain.     Problem began: 12/12/2021.   Problem occurred: Carrying a suitcase down a flight of stairs.   Pain is reported as 1/10 on pain scale.  General health as reported by patient is good.  Pertinent medical history includes: none.   Red flags:  None as reported by patient.  Medical allergies: other. Other medical allergies details: Sulfur And Penicillin Drugs..   Surgeries include:  Orthopedic surgery and other. Other surgery history details: Fix for double vision..    Current medications:  None.    Current occupation is None.                     Therapist Generated HPI Evaluation  Problem details: Pain on right side, just above his hip.  Felt like rib pain at first.  Injured his right side carrying a suitcase two weeks.  Started to have LBP in May, 2021 and jumping and landing on his head, jumping to a floating dock. Main complaint is seeing if he has an injury from May.  .         Type of problem:  Thoracic spine.    This is a recurrent condition.  Condition occurred with:  A fall/slip and lifting.  Where condition occurred: during recreation/sport and at home.  Patient reports pain:  Lower thoracic.  Pain is described as aching and is intermittent.    Since onset symptoms are gradually improving.  Symptoms are exacerbated by carrying, lifting and certain positions  and relieved by rest (Doing more cardio and lifting weights less  ).      Barriers include:  None as reported by patient.                        Objective:  System         Lumbar/SI Evaluation  ROM:  Arom wnl lumbar: tightness, B hip flexors, tested prone.  AROM Lumbar:   Flexion:            75 -  Ext:                    75 +   Side Bend:        Left:  Wnls    Right:  Wnls  Rotation:           Left:     Right:   Side Glide:        Left:  Wnls    Right:  Wnls      AROM Thoracic:  Flex:               Ext:                 Rotation:        Left:  40%    Right:  50%       Lumbar Myotomes:  normal            Lumbar DTR's:  not assessed      Cord Signs:  not assessed    Lumbar Dermtomes:  not assessed                Neural Tension/Mobility:      Left side:SLR or Femoral Nerve  negative.     Right side:   Femoral Nerve or SLR  negative.   Lumbar Palpation:      Tenderness not present at Left:    Quadratus Lumborum or Erector Spinae  Tenderness present at Right: Quadratus Lumborum (mild)  Tenderness not present at Right:  Erector Spinae    Lumbar Provocation:      Left negative with:  PROM hip    Right negative with:  PROM hip  Spinal Segmental Conclusions: Mild hypo L3, L4 right unilateral PA's.                 Cervical/Thoracic Evaluation  Cervical AROM: normal                                                                     General     ROS    Assessment/Plan:    Patient is a 26 year old male with thoracic and lumbar complaints.    Patient has the following significant findings with corresponding treatment plan.                Diagnosis 1:  Lumbar extension dysfunction, intermittent right sided LBP  Pain -  manual therapy, self management, education, directional preference exercise and home program  Decreased ROM/flexibility - manual therapy, therapeutic exercise and home program  Decreased joint mobility - manual therapy, therapeutic exercise and home program  Decreased strength - therapeutic exercise, therapeutic activities and home program  Decreased function - therapeutic activities and home program    Therapy Evaluation Codes:     1) Clinical presentation characteristics are:   Stable/Uncomplicated.  2) Decision-Making    Low complexity using standardized patient assessment instrument and/or measureable assessment of functional outcome.  Cumulative Therapy Evaluation is: Low complexity.    Previous and current functional limitations:  (See Goal Flow Sheet for this information)    Short term and Long term  goals: (See Goal Flow Sheet for this information)     Communication ability:  Patient appears to be able to clearly communicate and understand verbal and written communication and follow directions correctly.  Treatment Explanation - The following has been discussed with the patient:   RX ordered/plan of care  Anticipated outcomes  Possible risks and side effects  This patient would benefit from PT intervention to resume normal activities.   Rehab potential is good.    Frequency:  1 X week, once daily  Duration:  for 3 weeks  Discharge Plan:  Achieve all LTG.  Independent in home treatment program.  Reach maximal therapeutic benefit.    Please refer to the daily flowsheet for treatment today, total treatment time and time spent performing 1:1 timed codes.

## 2022-01-29 ENCOUNTER — HEALTH MAINTENANCE LETTER (OUTPATIENT)
Age: 27
End: 2022-01-29

## 2022-09-08 ENCOUNTER — LAB REQUISITION (OUTPATIENT)
Dept: LAB | Facility: CLINIC | Age: 27
End: 2022-09-08

## 2022-09-08 PROCEDURE — 86481 TB AG RESPONSE T-CELL SUSP: CPT | Performed by: INTERNAL MEDICINE

## 2022-09-12 LAB
GAMMA INTERFERON BACKGROUND BLD IA-ACNC: 0.18 IU/ML
M TB IFN-G BLD-IMP: NEGATIVE
M TB IFN-G CD4+ BCKGRND COR BLD-ACNC: 9.82 IU/ML
MITOGEN IGNF BCKGRD COR BLD-ACNC: -0.09 IU/ML
MITOGEN IGNF BCKGRD COR BLD-ACNC: -0.15 IU/ML
QUANTIFERON MITOGEN: 10 IU/ML
QUANTIFERON NIL TUBE: 0.18 IU/ML
QUANTIFERON TB1 TUBE: 0.09 IU/ML
QUANTIFERON TB2 TUBE: 0.03

## 2022-10-04 ENCOUNTER — OFFICE VISIT (OUTPATIENT)
Dept: OPHTHALMOLOGY | Facility: CLINIC | Age: 27
End: 2022-10-04
Attending: OPHTHALMOLOGY
Payer: COMMERCIAL

## 2022-10-04 DIAGNOSIS — G93.5 CHIARI MALFORMATION TYPE I (H): ICD-10-CM

## 2022-10-04 DIAGNOSIS — H53.2 DIPLOPIA: Primary | ICD-10-CM

## 2022-10-04 DIAGNOSIS — H50.32 INTERMITTENT ESOTROPIA, ALTERNATING: ICD-10-CM

## 2022-10-04 PROCEDURE — G0463 HOSPITAL OUTPT CLINIC VISIT: HCPCS | Mod: 25

## 2022-10-04 PROCEDURE — 92060 SENSORIMOTOR EXAMINATION: CPT | Mod: 26 | Performed by: OPHTHALMOLOGY

## 2022-10-04 PROCEDURE — 92015 DETERMINE REFRACTIVE STATE: CPT

## 2022-10-04 PROCEDURE — 92004 COMPRE OPH EXAM NEW PT 1/>: CPT | Performed by: OPHTHALMOLOGY

## 2022-10-04 PROCEDURE — 92060 SENSORIMOTOR EXAMINATION: CPT | Performed by: OPHTHALMOLOGY

## 2022-10-04 ASSESSMENT — VISUAL ACUITY
OD_SC: 20/20
OS_SC: 20/20
METHOD: SNELLEN - LINEAR

## 2022-10-04 ASSESSMENT — TONOMETRY
OD_IOP_MMHG: 22
OS_IOP_MMHG: 22
IOP_METHOD: SINGLE KW ICARE

## 2022-10-04 ASSESSMENT — REFRACTION
OD_CYLINDER: SPHERE
OS_SPHERE: +0.50
OD_SPHERE: PLANO
OS_AXIS: 090
OS_CYLINDER: +0.25

## 2022-10-04 ASSESSMENT — CONF VISUAL FIELD
OD_NORMAL: 1
OS_NORMAL: 1
METHOD: COUNTING FINGERS

## 2022-10-04 NOTE — NURSING NOTE
Chief Complaint(s) and History of Present Illness(es)     Esotropia Follow Up     Laterality: both eyes    Associated symptoms: Negative for droopy eyelid, unequal pupil size and blurred vision    Treatments tried: surgery    Comments: Diplopia resolved for years after surgery (2018). Double vision started back up about a year ago. It comes and goes, but has been progressively getting worse. Diplopia worse at D. Always horizontal diplopia with small vertical and torsional component. Pt thinks he might use a AHP. Will sometimes close one eye to look at a computer screen. Able to ignore RE when needs to. Pt interested in all treatments (not really wanting surgery unless needed)   Has not had eye exam since LV here in 2018.              Comments     Inf: pt

## 2022-10-05 ASSESSMENT — EXTERNAL EXAM - LEFT EYE: OS_EXAM: NORMAL

## 2022-10-05 ASSESSMENT — SLIT LAMP EXAM - LIDS
COMMENTS: NORMAL
COMMENTS: NORMAL

## 2022-10-05 ASSESSMENT — CUP TO DISC RATIO
OS_RATIO: 0.1
OD_RATIO: 0.1

## 2022-10-05 ASSESSMENT — EXTERNAL EXAM - RIGHT EYE: OD_EXAM: NORMAL

## 2022-10-06 NOTE — PROGRESS NOTES
"Chief Complaints and History of Present Illnesses   Patient presents with     Esotropia Follow Up     Diplopia resolved for years after surgery (2018). Double vision started back up about a year ago. It comes and goes, but has been progressively getting worse. Diplopia worse at D. Always horizontal diplopia with small vertical and torsional component. Pt thinks he might use a AHP. Will sometimes close one eye to look at a computer screen. Able to ignore RE when needs to. Pt interested in all treatments (not really wanting surgery unless needed)   Has not had eye exam since LV here in 2018.   Review of systems for the eyes was negative other than the pertinent positives and negatives noted in the HPI.  History is obtained from the patient    Referring provider: Referred Self     Primary care: [facility]   Assessment   Lauro Stephen is a 26 year old male who presents with:       ICD-10-CM    1. 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 Intermittent esotropia, alternating  H50.32    3. Chiari malformation type I (H)  G93.5          Plan  Brayan has recurrent diplopia.  He is s/p BMRc 5.5mm but has Chiari malformation.  I recommend eye muscle surgery. Today with Lauro, I reviewed the indications, risks, benefits, and alternatives of eye muscle surgery including, but not limited to, failure obtain the desired ocular alignment (\"over\" or \"under\" correction), diplopia, and damage to any structure in or around the eye that may necessitate treatment with medicine, laser, or surgery. I further explained that the goal of surgery is to help control Lauro's strabismus. Surgery will not \"cure\" Lauro's strabismus or resolve/prevent the need for refractive correction. Additional strabismus surgery may be required in the short or long term. I emphasized that regular follow-up to monitor and optimize his vision and alignment would be necessary. We also discussed the risks of surgical injury, bleeding, and " "infection which may necessitate further medical or surgical treatment and which may result in diplopia, loss of vision, blindness, or loss of the eye(s) in less than 1% of cases and the remote possibility of permanent damage to any organ system or death with the use of general anesthesia.  I explained that we would hide visible scars as much as possible in natural creases but that every patient heals and pigments differently resulting in a variable degree of scarring to the eyes or surrounding facial structures after surgery.  I provided multiple opportunities for questions, answered all questions to the best of my ability, and confirmed that my answers and my discussion were understood.  OK for adjustable.     Further details of the management plan can be found in the \"Patient Instructions\" section which was printed and given to the patient at checkout.  No follow-ups on file.   Attending Physician Attestation:  Complete documentation of historical and exam elements from today's encounter can be found in the full encounter summary report (not reduplicated in this progress note).  I personally obtained the chief complaint(s) and history of present illness.  I confirmed and edited as necessary the review of systems, past medical/surgical history, family history, social history, and examination findings as documented by others; and I examined the patient myself.  I personally reviewed the relevant tests, images, and reports as documented above.  I formulated and edited as necessary the assessment and plan and discussed the findings and management plan with the patient and family. - Lorri Guadalupe MD 10/5/2022 9:22 PM        "

## 2023-01-11 ENCOUNTER — TELEPHONE (OUTPATIENT)
Dept: OPHTHALMOLOGY | Facility: CLINIC | Age: 28
End: 2023-01-11

## 2023-01-11 NOTE — TELEPHONE ENCOUNTER
1/12/2023 1:47PM Asked Brayan if he has had his H&P for surgery on 1/8. He indicated he was not aware that one was needed, but will schedule 'super soon.' He believes that he received the surgery packet mailed to him when surgery was scheduled, but has not opened it yet. He requested that his 1/16 orthoptist appointment be rescheduled to 1/17, which was done. Reminded him to schedule H&P ASAP and let him know [facility] will call 1/16 or 1/17 with arrival time, npo instructions and to confirm that he has a responsible adult  on the day of surgery and someone to stay with him for 24 hours after surgery.    1/11/2023 9:41AM DADA requesting a call back to [phone].

## 2023-01-13 ENCOUNTER — TELEPHONE (OUTPATIENT)
Dept: OPHTHALMOLOGY | Facility: CLINIC | Age: 28
End: 2023-01-13
Payer: COMMERCIAL

## 2023-01-13 NOTE — TELEPHONE ENCOUNTER
1/13/2023 2:15PM Brayan states that he is scheduled to work on 1/20, even though he told his employer he will need to be off work, and is requesting that we fax him a letter stating that he will need to be off work until 1/23/2023, depending on how he feels. He would like the letter e-mailed to him at [email or web address].

## 2023-01-17 ENCOUNTER — OFFICE VISIT (OUTPATIENT)
Dept: OPHTHALMOLOGY | Facility: CLINIC | Age: 28
End: 2023-01-17
Attending: OPHTHALMOLOGY
Payer: COMMERCIAL

## 2023-01-17 ENCOUNTER — OFFICE VISIT (OUTPATIENT)
Dept: FAMILY MEDICINE | Facility: CLINIC | Age: 28
End: 2023-01-17
Payer: COMMERCIAL

## 2023-01-17 VITALS
DIASTOLIC BLOOD PRESSURE: 78 MMHG | WEIGHT: 147 LBS | OXYGEN SATURATION: 98 % | HEIGHT: 67 IN | BODY MASS INDEX: 23.07 KG/M2 | RESPIRATION RATE: 16 BRPM | SYSTOLIC BLOOD PRESSURE: 120 MMHG | HEART RATE: 74 BPM

## 2023-01-17 DIAGNOSIS — G93.5 CHIARI MALFORMATION TYPE I (H): ICD-10-CM

## 2023-01-17 DIAGNOSIS — H50.9 STRABISMUS: Primary | ICD-10-CM

## 2023-01-17 DIAGNOSIS — H50.32 INTERMITTENT ESOTROPIA, ALTERNATING: ICD-10-CM

## 2023-01-17 DIAGNOSIS — H53.2 DIPLOPIA: Primary | ICD-10-CM

## 2023-01-17 PROCEDURE — 99213 OFFICE O/P EST LOW 20 MIN: CPT | Performed by: FAMILY MEDICINE

## 2023-01-17 PROCEDURE — 92060 SENSORIMOTOR EXAMINATION: CPT

## 2023-01-17 PROCEDURE — G0463 HOSPITAL OUTPT CLINIC VISIT: HCPCS | Mod: 25

## 2023-01-17 PROCEDURE — 92060 SENSORIMOTOR EXAMINATION: CPT | Mod: 26 | Performed by: OPHTHALMOLOGY

## 2023-01-17 ASSESSMENT — CONF VISUAL FIELD
OS_SUPERIOR_TEMPORAL_RESTRICTION: 0
OS_INFERIOR_TEMPORAL_RESTRICTION: 0
OS_INFERIOR_NASAL_RESTRICTION: 0
OD_INFERIOR_NASAL_RESTRICTION: 0
OS_NORMAL: 1
OD_NORMAL: 1
OD_SUPERIOR_NASAL_RESTRICTION: 0
OD_INFERIOR_TEMPORAL_RESTRICTION: 0
METHOD: TOYS
OD_SUPERIOR_TEMPORAL_RESTRICTION: 0
OS_SUPERIOR_NASAL_RESTRICTION: 0

## 2023-01-17 ASSESSMENT — VISUAL ACUITY
METHOD: SNELLEN - LINEAR
OS_SC: 20/20
OD_SC: 20/20

## 2023-01-17 NOTE — PROGRESS NOTES
[facility] [address] [address]  Phone: [phone]  Fax: [phone]  Primary Provider: [facility]  Pre-op Performing Provider: GAGE CLARK         PREOPERATIVE EVALUATION:  Today's date: 1/17/2023    Lauro Stephen is a 27 year old male who presents for a preoperative evaluation.    Surgical Information:  Surgery/Procedure: strabismus repair  Surgery Location:  OR [facility]  Surgeon: Dr. Guadalupe  Surgery Date: 1/18/23  Time of Surgery:   Where patient plans to recover: At home with family  Fax number for surgical facility: Note does not need to be faxed, will be available electronically in Epic.    Type of Anesthesia Anticipated: to be determined    Assessment & Plan     The proposed surgical procedure is considered LOW risk.    Problem List Items Addressed This Visit    None                     RECOMMENDATION:  APPROVAL GIVEN to proceed with proposed procedure, without further diagnostic evaluation.            Subjective     HPI related to upcoming procedure: Strabismus repair  Preop Questions 1/12/2023   1. Have you ever had a heart attack or stroke? No   2. Have you ever had surgery on your heart or blood vessels, such as a stent placement, a coronary artery bypass, or surgery on an artery in your head, neck, heart, or legs? No   3. Do you have chest pain with activity? No   4. Do you have a history of  heart failure? No   5. Do you currently have a cold, bronchitis or symptoms of other infection? No   6. Do you have a cough, shortness of breath, or wheezing? No   7. Do you or anyone in your family have previous history of blood clots? UNKNOWN -    8. Do you or does anyone in your family have a serious bleeding problem such as prolonged bleeding following surgeries or cuts? No   9. Have you ever had problems with anemia or been told to take iron pills? No   10. Have you had any abnormal blood loss such as  black, tarry or bloody stools? No   11. Have you ever had a blood transfusion? No   12. Are you willing to have a blood transfusion if it is medically needed before, during, or after your surgery? Yes   13. Have you or any of your relatives ever had problems with anesthesia? No   14. Do you have sleep apnea, excessive snoring or daytime drowsiness? No   15. Do you have any artifical heart valves or other implanted medical devices like a pacemaker, defibrillator, or continuous glucose monitor? No   16. Do you have artificial joints? No   17. Are you allergic to latex? No       Health Care Directive:  Patient does not have a Health Care Directive or Living Will: Discussed advance care planning with patient; information given to patient to review.    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       Allergies   Allergen Reactions     Sulfa Drugs Shortness Of Breath     Penicillin G Rash        Social History     Tobacco Use     Smoking status: Never     Smokeless  "tobacco: Never   Substance Use Topics     Alcohol use: Never     Family history is negative for DVT or PE.  Also no family history of reactions or intolerances to anesthesia.  History   Drug Use Unknown         Objective     /78 (BP Location: Right arm, Patient Position: Sitting)   Pulse 74   Resp 16   Ht 1.689 m (5' 6.5\")   Wt 66.7 kg (147 lb)   SpO2 98%   BMI 23.37 kg/m      Physical Exam      Exam:  General: alert and oriented ×3 no acute distress.    HEENT: Normocephalic and atraumatic.     Hearing is grossly normal and there is no otorrhea.     Chest: has bilateral rise with no increased work of breathing.  CTA B    Cardiovascular: normal perfusion and brisk capillary refill.  S1-S2    Musculoskeletal: no gross focal abnormalities and normal gait.    Neuro: no gross focal abnormalities and memory seems intact.    Psychiatric: speech is clear and coherent and fluent. Patient dressed appropriately for the weather. Mood is appropriate and affect is full.            Revised Cardiac Risk Index (RCRI):  The patient has the following serious cardiovascular risks for perioperative complications:   - No serious cardiac risks = 0 points     RCRI Interpretation: 0 points: Class I (very low risk - 0.4% complication rate)         Also see downtime documentation scanned into chart.  Signed Electronically by: Radha Robin MD  Copy of this evaluation report is provided to requesting physician.      "

## 2023-01-17 NOTE — NURSING NOTE
Chief Complaint(s) and History of Present Illness(es)     Diplopia Follow Up            Associated symptoms: Negative for droopy eyelid, unequal pupil size and headaches    Treatments tried: surgery    Comments: VA good and stable. Diplopia stable, maybe even worse, always RET. Usually ignores second image.   No glasses wear. Strabismus surgery tomorrow.           Comments    Inf: pt                  
I have personally performed a face to face diagnostic evaluation on this patient. I have reviewed the ACP note and agree with the history, exam and plan of care, except as noted.

## 2023-01-17 NOTE — PROGRESS NOTES
Chief Complaint(s) & History of Present Illness  Chief Complaint(s) and History of Present Illness(es)     Diplopia Follow Up            Associated symptoms: Negative for droopy eyelid, unequal pupil size and headaches    Treatments tried: surgery    Comments: VA good and stable. Diplopia stable, maybe even worse, always RET. Usually ignores second image.   No glasses wear. Strabismus surgery tomorrow.           Comments    Inf: pt                    Assessment and Plan:      Lauro Stephen is a 27 year old male who presents with:     Diplopia  Stable. Notes torsional component to diplopia.    Intermittent esotropia, alternating  Constant ET D>N.  S/p [facility] 5.5  Photos taken.  - Sensorimotor    Chiari malformation type I (H)         PLAN:  Return for surgery with Dr. Guadalupe as scheduled.

## 2023-01-18 ENCOUNTER — ANESTHESIA EVENT (OUTPATIENT)
Dept: SURGERY | Facility: CLINIC | Age: 28
End: 2023-01-18
Payer: COMMERCIAL

## 2023-01-18 ENCOUNTER — HOSPITAL ENCOUNTER (OUTPATIENT)
Facility: CLINIC | Age: 28
Discharge: HOME | End: 2023-01-18
Attending: OPHTHALMOLOGY | Admitting: OPHTHALMOLOGY
Payer: COMMERCIAL

## 2023-01-18 ENCOUNTER — ANESTHESIA (OUTPATIENT)
Dept: SURGERY | Facility: CLINIC | Age: 28
End: 2023-01-18
Payer: COMMERCIAL

## 2023-01-18 VITALS
RESPIRATION RATE: 16 BRPM | TEMPERATURE: 97.8 F | WEIGHT: 144.84 LBS | HEIGHT: 67 IN | BODY MASS INDEX: 22.73 KG/M2 | HEART RATE: 92 BPM | SYSTOLIC BLOOD PRESSURE: 115 MMHG | OXYGEN SATURATION: 96 % | DIASTOLIC BLOOD PRESSURE: 65 MMHG

## 2023-01-18 DIAGNOSIS — Z98.890 POSTOPERATIVE EYE STATE: Primary | ICD-10-CM

## 2023-01-18 PROCEDURE — 370N000017 HC ANESTHESIA TECHNICAL FEE, PER MIN: Performed by: OPHTHALMOLOGY

## 2023-01-18 PROCEDURE — 710N000012 HC RECOVERY PHASE 2, PER MINUTE: Performed by: OPHTHALMOLOGY

## 2023-01-18 PROCEDURE — 258N000003 HC RX IP 258 OP 636: Performed by: NURSE ANESTHETIST, CERTIFIED REGISTERED

## 2023-01-18 PROCEDURE — 250N000009 HC RX 250: Performed by: NURSE ANESTHETIST, CERTIFIED REGISTERED

## 2023-01-18 PROCEDURE — 250N000025 HC SEVOFLURANE, PER MIN: Performed by: OPHTHALMOLOGY

## 2023-01-18 PROCEDURE — 67311 REVISE EYE MUSCLE: CPT | Mod: 50 | Performed by: OPHTHALMOLOGY

## 2023-01-18 PROCEDURE — 67332 REREVISE EYE MUSCLES ADD-ON: CPT | Mod: RT | Performed by: OPHTHALMOLOGY

## 2023-01-18 PROCEDURE — 250N000011 HC RX IP 250 OP 636: Performed by: NURSE ANESTHETIST, CERTIFIED REGISTERED

## 2023-01-18 PROCEDURE — 250N000013 HC RX MED GY IP 250 OP 250 PS 637: Performed by: ANESTHESIOLOGY

## 2023-01-18 PROCEDURE — 272N000001 HC OR GENERAL SUPPLY STERILE: Performed by: OPHTHALMOLOGY

## 2023-01-18 PROCEDURE — 710N000010 HC RECOVERY PHASE 1, LEVEL 2, PER MIN: Performed by: OPHTHALMOLOGY

## 2023-01-18 PROCEDURE — 360N000076 HC SURGERY LEVEL 3, PER MIN: Performed by: OPHTHALMOLOGY

## 2023-01-18 PROCEDURE — 250N000011 HC RX IP 250 OP 636: Performed by: OPHTHALMOLOGY

## 2023-01-18 PROCEDURE — 999N000141 HC STATISTIC PRE-PROCEDURE NURSING ASSESSMENT: Performed by: OPHTHALMOLOGY

## 2023-01-18 PROCEDURE — 250N000009 HC RX 250: Performed by: OPHTHALMOLOGY

## 2023-01-18 RX ORDER — BETAMETHASONE SODIUM PHOSPHATE AND BETAMETHASONE ACETATE 3; 3 MG/ML; MG/ML
INJECTION, SUSPENSION INTRA-ARTICULAR; INTRALESIONAL; INTRAMUSCULAR; SOFT TISSUE PRN
Status: DISCONTINUED | OUTPATIENT
Start: 2023-01-18 | End: 2023-01-18 | Stop reason: HOSPADM

## 2023-01-18 RX ORDER — NALOXONE HYDROCHLORIDE 0.4 MG/ML
0.4 INJECTION, SOLUTION INTRAMUSCULAR; INTRAVENOUS; SUBCUTANEOUS
Status: DISCONTINUED | OUTPATIENT
Start: 2023-01-18 | End: 2023-01-18 | Stop reason: HOSPADM

## 2023-01-18 RX ORDER — PROPOFOL 10 MG/ML
INJECTION, EMULSION INTRAVENOUS PRN
Status: DISCONTINUED | OUTPATIENT
Start: 2023-01-18 | End: 2023-01-18

## 2023-01-18 RX ORDER — BALANCED SALT SOLUTION 6.4; .75; .48; .3; 3.9; 1.7 MG/ML; MG/ML; MG/ML; MG/ML; MG/ML; MG/ML
SOLUTION OPHTHALMIC PRN
Status: DISCONTINUED | OUTPATIENT
Start: 2023-01-18 | End: 2023-01-18 | Stop reason: HOSPADM

## 2023-01-18 RX ORDER — SODIUM CHLORIDE, SODIUM LACTATE, POTASSIUM CHLORIDE, CALCIUM CHLORIDE 600; 310; 30; 20 MG/100ML; MG/100ML; MG/100ML; MG/100ML
INJECTION, SOLUTION INTRAVENOUS CONTINUOUS
Status: DISCONTINUED | OUTPATIENT
Start: 2023-01-18 | End: 2023-01-18 | Stop reason: HOSPADM

## 2023-01-18 RX ORDER — SODIUM CHLORIDE, SODIUM LACTATE, POTASSIUM CHLORIDE, CALCIUM CHLORIDE 600; 310; 30; 20 MG/100ML; MG/100ML; MG/100ML; MG/100ML
INJECTION, SOLUTION INTRAVENOUS CONTINUOUS PRN
Status: DISCONTINUED | OUTPATIENT
Start: 2023-01-18 | End: 2023-01-18

## 2023-01-18 RX ORDER — NALOXONE HYDROCHLORIDE 0.4 MG/ML
0.2 INJECTION, SOLUTION INTRAMUSCULAR; INTRAVENOUS; SUBCUTANEOUS
Status: DISCONTINUED | OUTPATIENT
Start: 2023-01-18 | End: 2023-01-18 | Stop reason: HOSPADM

## 2023-01-18 RX ORDER — ACETAMINOPHEN 325 MG/1
975 TABLET ORAL ONCE
Status: DISCONTINUED | OUTPATIENT
Start: 2023-01-18 | End: 2023-01-18 | Stop reason: HOSPADM

## 2023-01-18 RX ORDER — OXYCODONE HYDROCHLORIDE 10 MG/1
10 TABLET ORAL EVERY 4 HOURS PRN
Status: DISCONTINUED | OUTPATIENT
Start: 2023-01-18 | End: 2023-01-18 | Stop reason: HOSPADM

## 2023-01-18 RX ORDER — PROPOFOL 10 MG/ML
INJECTION, EMULSION INTRAVENOUS CONTINUOUS PRN
Status: DISCONTINUED | OUTPATIENT
Start: 2023-01-18 | End: 2023-01-18

## 2023-01-18 RX ORDER — ACETAMINOPHEN 325 MG/1
975 TABLET ORAL ONCE
Status: COMPLETED | OUTPATIENT
Start: 2023-01-18 | End: 2023-01-18

## 2023-01-18 RX ORDER — KETOROLAC TROMETHAMINE 30 MG/ML
15 INJECTION, SOLUTION INTRAMUSCULAR; INTRAVENOUS
Status: DISCONTINUED | OUTPATIENT
Start: 2023-01-18 | End: 2023-01-18 | Stop reason: HOSPADM

## 2023-01-18 RX ORDER — HYDROMORPHONE HYDROCHLORIDE 1 MG/ML
0.2 INJECTION, SOLUTION INTRAMUSCULAR; INTRAVENOUS; SUBCUTANEOUS EVERY 5 MIN PRN
Status: DISCONTINUED | OUTPATIENT
Start: 2023-01-18 | End: 2023-01-18 | Stop reason: HOSPADM

## 2023-01-18 RX ORDER — DEXAMETHASONE SODIUM PHOSPHATE 4 MG/ML
INJECTION, SOLUTION INTRA-ARTICULAR; INTRALESIONAL; INTRAMUSCULAR; INTRAVENOUS; SOFT TISSUE PRN
Status: DISCONTINUED | OUTPATIENT
Start: 2023-01-18 | End: 2023-01-18

## 2023-01-18 RX ORDER — OXYCODONE HYDROCHLORIDE 5 MG/1
5 TABLET ORAL EVERY 4 HOURS PRN
Status: DISCONTINUED | OUTPATIENT
Start: 2023-01-18 | End: 2023-01-18 | Stop reason: HOSPADM

## 2023-01-18 RX ORDER — ONDANSETRON 2 MG/ML
INJECTION INTRAMUSCULAR; INTRAVENOUS PRN
Status: DISCONTINUED | OUTPATIENT
Start: 2023-01-18 | End: 2023-01-18

## 2023-01-18 RX ORDER — FENTANYL CITRATE 50 UG/ML
25 INJECTION, SOLUTION INTRAMUSCULAR; INTRAVENOUS EVERY 5 MIN PRN
Status: DISCONTINUED | OUTPATIENT
Start: 2023-01-18 | End: 2023-01-18 | Stop reason: HOSPADM

## 2023-01-18 RX ORDER — FENTANYL CITRATE 50 UG/ML
50 INJECTION, SOLUTION INTRAMUSCULAR; INTRAVENOUS EVERY 5 MIN PRN
Status: DISCONTINUED | OUTPATIENT
Start: 2023-01-18 | End: 2023-01-18 | Stop reason: HOSPADM

## 2023-01-18 RX ORDER — ONDANSETRON 2 MG/ML
4 INJECTION INTRAMUSCULAR; INTRAVENOUS EVERY 30 MIN PRN
Status: DISCONTINUED | OUTPATIENT
Start: 2023-01-18 | End: 2023-01-18 | Stop reason: HOSPADM

## 2023-01-18 RX ORDER — ONDANSETRON 4 MG/1
4 TABLET, ORALLY DISINTEGRATING ORAL EVERY 30 MIN PRN
Status: DISCONTINUED | OUTPATIENT
Start: 2023-01-18 | End: 2023-01-18 | Stop reason: HOSPADM

## 2023-01-18 RX ORDER — OXYCODONE HYDROCHLORIDE 5 MG/1
5 TABLET ORAL ONCE
Status: DISCONTINUED | OUTPATIENT
Start: 2023-01-18 | End: 2023-01-18 | Stop reason: HOSPADM

## 2023-01-18 RX ORDER — FENTANYL CITRATE 50 UG/ML
INJECTION, SOLUTION INTRAMUSCULAR; INTRAVENOUS PRN
Status: DISCONTINUED | OUTPATIENT
Start: 2023-01-18 | End: 2023-01-18

## 2023-01-18 RX ORDER — OXYCODONE HYDROCHLORIDE 5 MG/1
5 TABLET ORAL EVERY 6 HOURS PRN
Qty: 6 TABLET | Refills: 0 | Status: SHIPPED | OUTPATIENT
Start: 2023-01-18 | End: 2023-01-21

## 2023-01-18 RX ORDER — OXYCODONE HYDROCHLORIDE 5 MG/1
10 TABLET ORAL EVERY 4 HOURS PRN
Status: DISCONTINUED | OUTPATIENT
Start: 2023-01-18 | End: 2023-01-18 | Stop reason: HOSPADM

## 2023-01-18 RX ORDER — KETOROLAC TROMETHAMINE 30 MG/ML
INJECTION, SOLUTION INTRAMUSCULAR; INTRAVENOUS PRN
Status: DISCONTINUED | OUTPATIENT
Start: 2023-01-18 | End: 2023-01-18

## 2023-01-18 RX ORDER — LIDOCAINE HYDROCHLORIDE 20 MG/ML
INJECTION, SOLUTION INFILTRATION; PERINEURAL PRN
Status: DISCONTINUED | OUTPATIENT
Start: 2023-01-18 | End: 2023-01-18

## 2023-01-18 RX ORDER — HYDROMORPHONE HYDROCHLORIDE 1 MG/ML
0.4 INJECTION, SOLUTION INTRAMUSCULAR; INTRAVENOUS; SUBCUTANEOUS EVERY 5 MIN PRN
Status: DISCONTINUED | OUTPATIENT
Start: 2023-01-18 | End: 2023-01-18 | Stop reason: HOSPADM

## 2023-01-18 RX ORDER — OXYMETAZOLINE HYDROCHLORIDE 0.05 G/100ML
SPRAY NASAL PRN
Status: DISCONTINUED | OUTPATIENT
Start: 2023-01-18 | End: 2023-01-18 | Stop reason: HOSPADM

## 2023-01-18 RX ADMIN — FENTANYL CITRATE 50 MCG: 50 INJECTION, SOLUTION INTRAMUSCULAR; INTRAVENOUS at 14:17

## 2023-01-18 RX ADMIN — ONDANSETRON 4 MG: 2 INJECTION INTRAMUSCULAR; INTRAVENOUS at 14:26

## 2023-01-18 RX ADMIN — PROPOFOL 50 MG: 10 INJECTION, EMULSION INTRAVENOUS at 13:08

## 2023-01-18 RX ADMIN — PROPOFOL 200 MG: 10 INJECTION, EMULSION INTRAVENOUS at 12:35

## 2023-01-18 RX ADMIN — FENTANYL CITRATE 50 MCG: 50 INJECTION, SOLUTION INTRAMUSCULAR; INTRAVENOUS at 13:09

## 2023-01-18 RX ADMIN — FENTANYL CITRATE 100 MCG: 50 INJECTION, SOLUTION INTRAMUSCULAR; INTRAVENOUS at 12:46

## 2023-01-18 RX ADMIN — LIDOCAINE HYDROCHLORIDE 80 MG: 20 INJECTION, SOLUTION INFILTRATION; PERINEURAL at 12:35

## 2023-01-18 RX ADMIN — PROPOFOL 200 MCG/KG/MIN: 10 INJECTION, EMULSION INTRAVENOUS at 12:38

## 2023-01-18 RX ADMIN — PROPOFOL 50 MG: 10 INJECTION, EMULSION INTRAVENOUS at 12:47

## 2023-01-18 RX ADMIN — MIDAZOLAM 2 MG: 1 INJECTION INTRAMUSCULAR; INTRAVENOUS at 12:31

## 2023-01-18 RX ADMIN — KETOROLAC TROMETHAMINE 30 MG: 30 INJECTION, SOLUTION INTRAMUSCULAR at 14:26

## 2023-01-18 RX ADMIN — ACETAMINOPHEN 975 MG: 325 TABLET ORAL at 17:47

## 2023-01-18 RX ADMIN — SODIUM CHLORIDE, POTASSIUM CHLORIDE, SODIUM LACTATE AND CALCIUM CHLORIDE: 600; 310; 30; 20 INJECTION, SOLUTION INTRAVENOUS at 12:31

## 2023-01-18 RX ADMIN — SODIUM CHLORIDE, POTASSIUM CHLORIDE, SODIUM LACTATE AND CALCIUM CHLORIDE: 600; 310; 30; 20 INJECTION, SOLUTION INTRAVENOUS at 14:29

## 2023-01-18 RX ADMIN — DEXAMETHASONE SODIUM PHOSPHATE 8 MG: 4 INJECTION, SOLUTION INTRA-ARTICULAR; INTRALESIONAL; INTRAMUSCULAR; INTRAVENOUS; SOFT TISSUE at 12:35

## 2023-01-18 ASSESSMENT — ACTIVITIES OF DAILY LIVING (ADL)
ADLS_ACUITY_SCORE: 35

## 2023-01-18 NOTE — DISCHARGE INSTRUCTIONS
Same-Day Surgery   Adult Discharge Orders & Instructions     For 24 hours after surgery:  Get plenty of rest.  A responsible adult must stay with you for at least 24 hours after you leave the hospital.   Pain medication can slow your reflexes. Do not drive or use heavy equipment.  If you have weakness or tingling, don't drive or use heavy equipment until this feeling goes away.  Mixing alcohol and pain medication can cause dizziness and slow your breathing. It can even be fatal. Do not drink alcohol while taking pain medication.  Avoid strenuous or risky activities.  Ask for help when climbing stairs.   You may feel lightheaded.  If so, sit for a few minutes before standing.  Have someone help you get up.   If you have nausea (feel sick to your stomach), drink only clear liquids such as apple juice, ginger ale, broth or 7-Up.  Rest may also help.  Be sure to drink enough fluids.  Move to a regular diet as you feel able. Take pain medications with a small amount of solid food, such as toast or crackers, to avoid nausea.   A slight fever is normal. Call the doctor if your fever is over 100 F (37.7 C) (taken under the tongue) or lasts longer than 24 hours.  You may have a dry mouth, muscle aches, trouble sleeping or a sore throat.  These symptoms should go away after 24 hours.  Do not make important or legal decisions.   Pain Management:      1. Take pain medication (if prescribed) for pain as directed by your physician.        2. WARNING: If the pain medication you have been prescribed contains Tylenol  (acetaminophen), DO NOT take additional doses of Tylenol (acetaminophen).     Call your doctor for any of the followin.  Signs of infection (fever, growing tenderness at the surgery site, severe pain, a large amount of drainage or bleeding, foul-smelling drainage, redness, swelling).    2.  It has been over 8 to 10 hours since surgery and you are still not able to urinate (pee).    3.  Headache for over 24  hours.    4.  Numbness, tingling or weakness the day after surgery (if you had spinal anesthesia).  To contact a doctor, call  Dr. MIHIR Guadalupe, Ophthalmology. [facility]: [phone] or [facility] [phone]  or:  '   [phone] and ask for the Resident On Call for:          Opthamology   (answered 24 hours a day)  '   Emergency Department:  [facility]: [phone]  [facility]: [phone]    Strabismus Repair Discharge Instructions    Dr. Lorri Guadalupe      Your eye doctor performed surgery to help align your eye(s). During surgery, certain eye muscles are adjusted. This helps the muscles better control how the eye moves. Often, surgery is done in addition to other treatments.   How Surgery Works  Strabismus surgery is a safe, common operation. The doctor changes the placement or length of an eye muscle. This small change can pull the eye into proper alignment. The two most common methods of surgery are:  Recession, in which a muscle is moved to a new position on the eye.  Resection, in which a small section of an eye muscle is removed.  What to Expect  It is normal to experience the following:  Eye Redness. This will resolve slowly over 2- 4 weeks.  Pink tinged tears  Swollen, painful or itchy eyes  Sensitivity to bright lights.  Trouble opening eyes for 1-2 days.  Feeling dizzy or off balance until you get used to seeing differently.  After Surgery Care  Avoid rubbing your eyes.  You may use cool cloths to help with pain and/or itching.  Minimize direct contact with water for 1 week. Showering or bathing is allowed.  You may use a warm, wet washcloth to remove any dried drainage from the eye. Wipe eye from inner corner to the outer corner of the eye.   No swimming for 1 week.  Use sunglasses or a hat to protect eyes from bright lights if you are light sensitive.  You may wear glasses as soon as needed.  No heavy lifting or contact sports  for 1 week.   Use eye drops or eye ointment as directed to prevent infection and decrease swelling. Avoid touching surface of eye with the tube or bottle.   Suture Care  Sutures will dissolve over several weeks; they will not need to be removed.   Adjustable sutures may have been placed during surgery. You may need to stay in the recovery room for a longer period after surgery before a possible suture adjustment is performed. Once the suture is adjusted you will be sent home.   When to Call the Doctor   Eyelids are progressively getting more swollen and painful after 24 hours.  You see a dramatic change in the position of the eye over time.  Frequent or continuous vomiting.  Green or yellow drainage from the eye.  Temperature over 101 degrees.  If you experience worsening RSVP (Redness, Sensitivity to light, Vision, Pain), or develops fever or worsening discharge, call EITHER  [phone] (8am-4:30pm Monday-Friday)  [phone] (after hours & weekends)  and ask to speak with the Ophthalmology Resident or Fellow On-Call or return to the eye clinic or emergency room immediately.        Follow up for suture adjustment tomorrow 1/19       You received a medication called toradol through your IV which is similar to Ibuprofen. Do not take any more ibuprofen until after 8:30pm on 1/18/23.    You can take tylenol again after 11:45pm tonight.    You can take oxycodone anytime as you have not received any.

## 2023-01-18 NOTE — OR NURSING
Updated  patient's LOC intact & resp even & unlabored on RA, patient has dry cough & hoarse throat - no new orders rec'd. OK per  stated ok to goto phase 2 & to phone with any concerns.

## 2023-01-18 NOTE — ANESTHESIA CARE TRANSFER NOTE
Patient: Lauro Stephen    Procedure: Procedure(s):  RECESSION OR RESECTION, OR BOTH RECESSION AND RESECTION, MUSCLE, EXTRAOCULAR, USING ADJUSTABLE SUTURE RIGHT EYE       Diagnosis: Diplopia [H53.2]  Intermittent esotropia, alternating [H50.32]  Diagnosis Additional Information: No value filed.    Anesthesia Type:   General     Note:    Oropharynx: spontaneously breathing  Level of Consciousness: drowsy  Patient oxygen source: T-piece.    Independent Airway: airway patency satisfactory and stable  Dentition: dentition unchanged  Vital Signs Stable: post-procedure vital signs reviewed and stable  Report to RN Given: handoff report given  Patient transferred to: PACU    Handoff Report: Identifed the Patient, Identified the Reponsible Provider, Reviewed the pertinent medical history, Discussed the surgical course, Reviewed Intra-OP anesthesia mangement and issues during anesthesia, Set expectations for post-procedure period and Allowed opportunity for questions and acknowledgement of understanding      Vitals:  Vitals Value Taken Time   /74 01/18/23 1530   Temp 37.2    Pulse 75 01/18/23 1532   Resp 16 01/18/23 1531   SpO2 100 % 01/18/23 1532   Vitals shown include unvalidated device data.    Electronically Signed By: TIEN Harris CRNA  January 18, 2023  3:32 PM

## 2023-01-18 NOTE — ANESTHESIA PREPROCEDURE EVALUATION
Anesthesia Pre-Procedure Evaluation    Patient: Lauro Stephen   MRN: [id number] : 1995        Procedure : Procedure(s):  RECESSION OR RESECTION, OR BOTH RECESSION AND RESECTION, MUSCLE, EXTRAOCULAR, USING ADJUSTABLE SUTURE          Past Medical History:   Diagnosis Date     Esotropia       Past Surgical History:   Procedure Laterality Date     dental work       ORTHOPEDIC SURGERY      Bro[place] ([place])     RECESSION RESECTION (REPAIR STRABISMUS) BILATERAL Bilateral 2018    Procedure: RECESSION RESECTION (REPAIR STRABISMUS) BILATERAL;  Bilateral Strabismus Repair;  Surgeon: Lorri Guadalupe MD;  Location: [facility] OR     TONSILLECTOMY      age 4      Allergies   Allergen Reactions     Sulfa Drugs Shortness Of Breath     Penicillin G Rash      Social History     Tobacco Use     Smoking status: Never     Smokeless tobacco: Never   Substance Use Topics     Alcohol use: Never      Wt Readings from Last 1 Encounters:   23 65.7 kg (144 lb 13.5 oz)        Anesthesia Evaluation   Pt has had prior anesthetic. Type: General.    No history of anesthetic complications       ROS/MED HX  ENT/Pulmonary:  - neg pulmonary ROS     Neurologic:  - neg neurologic ROS     Cardiovascular:  - neg cardiovascular ROS     METS/Exercise Tolerance:     Hematologic:  - neg hematologic  ROS     Musculoskeletal:  - neg musculoskeletal ROS     GI/Hepatic:  - neg GI/hepatic ROS     Renal/Genitourinary:  - neg Renal ROS     Endo:  - neg endo ROS     Psychiatric/Substance Use:  - neg psychiatric ROS     Infectious Disease:  - neg infectious disease ROS     Malignancy:  - neg malignancy ROS     Other:  - neg other ROS          Physical Exam    Airway  airway exam normal           Respiratory Devices and Support         Dental       (+) Completely normal teeth      Cardiovascular   cardiovascular exam normal          Pulmonary   pulmonary exam normal                OUTSIDE LABS:  CBC: No results found for: WBC, HGB, HCT,  PLT  BMP: No results found for: NA, POTASSIUM, CHLORIDE, CO2, BUN, CR, GLC  COAGS: No results found for: PTT, INR, FIBR  POC:   Lab Results   Component Value Date    [facility] 78 05/16/2018     HEPATIC: No results found for: ALBUMIN, PROTTOTAL, ALT, AST, GGT, ALKPHOS, BILITOTAL, BILIDIRECT, LAUREEN  OTHER: No results found for: PH, LACT, A1C, CLAUDIO, PHOS, MAG, LIPASE, AMYLASE, TSH, T4, T3, CRP, SED    Anesthesia Plan    ASA Status:  1   NPO Status:  NPO Appropriate    Anesthesia Type: General.     - Airway: LMA   Induction: Intravenous.   Maintenance: TIVA.        Consents    Anesthesia Plan(s) and associated risks, benefits, and realistic alternatives discussed. Questions answered and patient/representative(s) expressed understanding.     - Discussed: Risks, Benefits and Alternatives for BOTH SEDATION and the PROCEDURE were discussed     - Discussed with:  Patient         Postoperative Care    Pain management: Oral pain medications.   PONV prophylaxis: Ondansetron (or other 5HT-3), Dexamethasone or Solumedrol     Comments:                Lit Shaffer MD, MD

## 2023-01-18 NOTE — ANESTHESIA PROCEDURE NOTES
Airway       Patient location during procedure: OR       Procedure Start/Stop Times: 1/18/2023 12:36 PM  Staff -        CRNA: Vince Santos APRN CRNA       Performed By: CRNAIndications and Patient Condition       Indications for airway management: joan-procedural       Induction type:intravenous       Mask difficulty assessment: 1 - vent by mask    Final Airway Details       Final airway type: supraglottic airway    Supraglottic Airway Details        Type: LMA       Brand: Air-Q       LMA size: 4.5    Post intubation assessment        Placement verified by: capnometry, equal breath sounds and chest rise        Number of attempts at approach: 1       Secured with: silk tape       Ease of procedure: easy       Dentition: Intact and Unchanged    Medication(s) Administered   Medication Administration Time: 1/18/2023 12:36 PM

## 2023-01-18 NOTE — ANESTHESIA POSTPROCEDURE EVALUATION
Patient: Lauro Stephen    Procedure: Procedure(s):  RECESSION OR RESECTION, OR BOTH RECESSION AND RESECTION, MUSCLE, EXTRAOCULAR, USING ADJUSTABLE SUTURE RIGHT EYE       Anesthesia Type:  General    Note:  Disposition: Outpatient   Postop Pain Control: Uneventful            Sign Out: Well controlled pain   PONV: No   Neuro/Psych: Uneventful            Sign Out: Acceptable/Baseline neuro status   Airway/Respiratory: Uneventful            Sign Out: Acceptable/Baseline resp. status   CV/Hemodynamics: Uneventful            Sign Out: Acceptable CV status; No obvious hypovolemia; No obvious fluid overload   Other NRE: NONE   DID A NON-ROUTINE EVENT OCCUR? No           Last vitals:  Vitals Value Taken Time   /60 01/18/23 1645   Temp 36.5  C (97.7  F) 01/18/23 1645   Pulse 98 01/18/23 1655   Resp 123 01/18/23 1655   SpO2 98 % 01/18/23 1655   Vitals shown include unvalidated device data.    Electronically Signed By: Evangelist Escalera MD  January 18, 2023  4:57 PM

## 2023-01-18 NOTE — OR NURSING
Patient arrived to pacu unresponsive to sternal rub & repeated stimulation, resp. even & unlabored, color pink & O2sats % on 10LO2 via LMA still in place from OR.  & CRNA to bedside & stated propofol off over an hour & waiting for LOC to improve, no orders on arrival to pacu other than to monitor closely & OK to remove LMA when LOC improves.  Increased LOC at 1550 noted, aroused with repeated stimulation & sternal rub, moving UEs purposefully to LMA & coughing & grimacing. Suctioned oral airway, elevated HOB to 70 degrees and removed LMA at 1550 & provided O2 with oxymask at 10LO2, resp even & mild intermittent snoring noted. Moderate amount of clear oral secretions noted & suctioned w/ LMA removal. Note equal breath sounds bilaterally on auscultation. Placed nasal airway to left nare with lubricating jelly with no difficulty & good affect of no snoring. Patient's LOC increased & continued to purposefully grab for nasal airway - dc'd nasal airway after 5 minutes & patient settled back to sleep with O2sats 99% on 10LO2 via oxymask, resp even & unlabored, no snoring. Continue to monitor patient closely. Dr. Escalera aware of above, LMA dc'd & nasal airway present briefly. No further orders at present.

## 2023-01-19 ENCOUNTER — OFFICE VISIT (OUTPATIENT)
Dept: OPHTHALMOLOGY | Facility: CLINIC | Age: 28
End: 2023-01-19
Attending: OPHTHALMOLOGY
Payer: COMMERCIAL

## 2023-01-19 DIAGNOSIS — G93.5 CHIARI MALFORMATION TYPE I (H): ICD-10-CM

## 2023-01-19 DIAGNOSIS — H53.2 DIPLOPIA: ICD-10-CM

## 2023-01-19 DIAGNOSIS — H50.32 INTERMITTENT ESOTROPIA, ALTERNATING: Primary | ICD-10-CM

## 2023-01-19 PROCEDURE — G0463 HOSPITAL OUTPT CLINIC VISIT: HCPCS

## 2023-01-19 PROCEDURE — 99024 POSTOP FOLLOW-UP VISIT: CPT | Performed by: OPHTHALMOLOGY

## 2023-01-24 ENCOUNTER — TELEPHONE (OUTPATIENT)
Dept: OPHTHALMOLOGY | Facility: CLINIC | Age: 28
End: 2023-01-24
Payer: COMMERCIAL

## 2023-01-24 DIAGNOSIS — Z98.890 POSTOPERATIVE EYE STATE: Primary | ICD-10-CM

## 2023-01-24 RX ORDER — TOBRAMYCIN AND DEXAMETHASONE 3; 1 MG/ML; MG/ML
1-2 SUSPENSION/ DROPS OPHTHALMIC 3 TIMES DAILY
Qty: 5 ML | Refills: 0 | Status: SHIPPED | OUTPATIENT
Start: 2023-01-24 | End: 2023-02-03

## 2023-01-24 NOTE — TELEPHONE ENCOUNTER
Spoke to patient at 1030    Pt with yellowish discharge/mattering in AM    Some redness noted on lower part of eye globes    Intermittent blurring of vision    Symptom been improving and better this morning yet per pt    Pt using Tobradex ointment only at night    Recommended preservative free tears every couple hours and if symptoms improve to try and continue 3-4 times til post-op    Reviewed to contact clinic for any acute vision changes/eye pain/worsening symptoms    Pt seemed comfortable with information/plan and verbally demonstrated understanding    Note to Dr. Guadalupe for review/amendment if applicable    Sergio Palumbo RN 10:35 AM 01/24/23               [facility] Call Center    Phone Message    May a detailed message be left on voicemail: yes     Reason for Call: Symptoms or Concerns     If patient has red-flag symptoms, warm transfer to triage line    Current symptom or concern: Yellow drainage from both eyes. Slight improvement but there is still quite a bit of drainage and also random blurred vision.    Symptoms have been present for:  3 day(s)    Has patient previously been seen for this? Yes    By: Dr. Guadalupe    Date: Sx on 1/18/23    Are there any new or worsening symptoms? Yes: new & Worsening.       Action Taken: Message routed to:  [facility]    Travel Screening: Not Applicable

## 2023-02-06 NOTE — OP NOTE
Procedure Date: 01/18/2023    PREOPERATIVE DIAGNOSES:    1.  Recurrent esotropia.  2.  Status post bimedial rectus recession of 5.5 mm on 05/16/2018.  3.  Type #1 Chiari malformation.  4.  Diplopia.    POSTOPERATIVE DIAGNOSES:     1.  Recurrent esotropia.  2.  Status post bimedial rectus recession of 5.5 mm on 05/16/2018.  3.  Type #1 Chiari malformation.  4.  Diplopia.     PROCEDURES:     1.  Forced duction testing.  2.  Exploration, lysis of adhesions on previously operated right medial rectus and left medial rectus muscles re-recession of right medial rectus muscle from 5.5 to 8.0 mm with a hangback of 2 for a total of 10 mm posterior to the original insertion.  3.  Left medial rectus re-recession from 5.5 mm to 8.5 mm posterior to the original insertion.  4.  Right lateral rectus resection of 4.0 mm with a 2 mm hang back on an adjustable suture.  5.  Subconjunctival injections of Celestone.    SURGEON:  Lorri Guadalupe M.D.    ANESTHESIA:  General.    ESTIMATED BLOOD LOSS:  1 mL.    COMPLICATIONS:  None.    INDICATIONS FOR PROCEDURE:  Brayan is a 27-year-old man with a complicated ocular history as noted above.  The risks, benefits and alternatives to repeat strabismus repair to restore his binocular alignment were discussed including but not limited to infection, bleeding, loss of vision, over or under correction of his alignment, poor cosmesis, need for further surgery.  He elected to proceed.    DESCRIPTION OF PROCEDURE:  After informed consent was obtained, Brayan, was taken to the operating room where general anesthesia was induced without complication.  He was prepped and draped in sterile ophthalmic fashion.  A timeout was performed.  Lid speculae were placed in both eyes and forced duction testing was performed.  There was 3+ tightness to abduction in the right eye and 2+ tightness to abduction in the left eye.  Lid speculum was removed from the left eye and an occluder was placed.  5-0 silk traction suture  placed at 6 and 12 o'clock of the limbus of the right eye and the eye was placed in the abducted position.  A nasal conjunctival peritomy was performed.  The infranasal and supranasal quadrants were dissected.  The right medial rectus muscle was hooked, using small and [place] hooks.  It was very tight on the muscle hook.  The scar tissue was lysed and the conjunctiva was thinned.  This was the previously operated muscle.  The Tenon's was cleaned posterior from the muscle belly.  The muscle was noted to be 5.5 mm posterior to the original insertion.  A 6-0 double-armed Vicryl suture was used to imbricate the muscle at the insertion using central and peripheral locking bites.  The muscle was disinserted from the globe.  Cautery was used for hemostasis.  A caliper was used to measure 8.0 mm posterior to the original insertion.  This was marked with a marking pen.  Scleral passes were performed.  These marks in the muscle was tied in a bow.  A lid speculum was removed from the right eye and placed in the left eye.  Attention was then turned to the left eye.  5-0 silk traction suture placed at 6 and 12 o'clock of the limbus of the left eye and the eye was placed in the abducted position.  A nasal conjunctival peritomy was performed.  The infranasal and supranasal quadrants were dissected.  The left medial rectus muscle was hooked, using small and [place] hooks.  The conjunctiva was thinned and the scar tissue was lysed, as this was a previously operated muscle.  The muscle was noted to be 5.5 mm posterior to the original insertion.  A 6-0 double-armed Vicryl suture was used to imbricate the muscle at its insertion using central and peripheral locking bites.  The muscle was disinserted from the globe.  Cautery was used for hemostasis.  A caliper was used to measure 8.5 mm posterior to the original insertion.  This was marked with a marking pen.  Scleral passes were performed at these marks and the muscle was tied in a  bow.  Forced duction testing and snap back testing were performed and there was trace tightness to abduction remaining in the right eye.  The left medial rectus muscle was then tied in a knot.  The right medial rectus muscle was allowed to hang back 2.0 millimeters further for a total of 10.0 mm posterior to the original insertion.  Snap back testing and forced ductions were normal in the right eye.  The conjunctiva was used to repair the nasal conjunctiva on the left eye and a lid speculum and traction sutures were removed.  The right eye was continued to be in the abducted position and the conjunctiva was carefully repaired using 8-0 Vicryl suture.  The right eye was then placed in the adducted position.  A temporal conjunctival peritomy was performed.  The inferotemporal and superotemporal quadrants were dissected.  The right lateral rectus muscle was hooked, using small and [place] hooks.  The Tenon's was cleaned posterior from the muscle belly.  A caliper was used to measure 4.0 mm posterior to the original insertion.  This was marked with a marking pen.  A caliper was used to measure 4.0 mm posterior to the original insertion and this was marked on the muscle belly.  A 6-0 double-armed Vicryl suture was used to imbricate the muscle at this bijan using central and peripheral locking bites.  A straight clamp was placed anterior to the suture and the muscle was transected anterior to the clamp.  Cautery was used at the clamp edge and the clamp was removed.  Scleral passes were performed at the original insertion and the muscle was allowed to hang back 2.0 mm on an adjustable suture.  A 5-0 silk bucket handle was placed 3 mm posterior to the limbus and the conjunctiva was partially repaired using 8-0 Vicryl suture.  The suture ends were tucked under the conjunctiva and in the fornices.  At this time subconjunctival Celestone was given in both eyes.  TobraDex ointment was then placed in both eyes.  A patch was  placed over the right eye to protect the adjustable sutures.  Brayan tolerated the procedure well and went to the recovery room in stable condition.  He will follow up on postoperative day [facility] for a suture adjustment.    Lorri Guadalupe MD        D: 2023   T: 2023   MT: [id number]    Name:     BRITTANY FRIEDMAN  MRN:      [id number]        Account:        [id number]   :      1995           Procedure Date: 2023     Document: [id number]

## 2023-02-08 ASSESSMENT — EXTERNAL EXAM - LEFT EYE: OS_EXAM: NORMAL

## 2023-02-08 ASSESSMENT — SLIT LAMP EXAM - LIDS
COMMENTS: NORMAL
COMMENTS: NORMAL

## 2023-02-08 ASSESSMENT — EXTERNAL EXAM - RIGHT EYE: OD_EXAM: NORMAL

## 2023-02-08 NOTE — PROGRESS NOTES
"Chief Complaints and History of Present Illnesses   Patient presents with     Post Op (Ophthalmology) Both Eyes     Slept well, no pain. Taking ibuprofen/tylenol this am and oxycodone at Noon today.    Review of systems for the eyes was negative other than the pertinent positives and negatives noted in the HPI.  History is obtained from the patient and wife.    Referring provider: Referred Self     Primary care: Clinic - [facility]   Assessment   Lauro Stephen is a 27 year old male who presents with:       ICD-10-CM    1. Intermittent esotropia, alternating  H50.32       2. Diplopia  H53.2       3. Chiari malformation type I (H)  G93.5             Plan  Mr. Stephen is doing OK on POD #1 s/p  1.  Forced duction testing.  2.  Exploration, lysis of adhesions on previously operated right medial rectus and left medial rectus muscles re-recession of right medial rectus muscle from 5.5 to 8.0 mm with a hangback of 2 for a total of 10 mm posterior to the original insertion.  3.  Left medial rectus re-recession from 5.5 mm to 8.5 mm posterior to the original insertion.  4.  Right lateral rectus resection of 4.0 mm with a 2 mm hang back on an adjustable suture.  5.  Subconjunctival injections of Celestone    RLR tied down tight today.  Brayan had mild vasovagal reaction (nausea)--likely from stress and oxycodone.  Able to complete procedure.  Tobradex ointment placed in both eyes.  F/u 4-6 weeks.       Further details of the management plan can be found in the \"Patient Instructions\" section which was printed and given to the patient at checkout.  Return in about 4 weeks (around 2/16/2023) for undilated exam in 4-6 weeks.   Attending Physician Attestation:  Complete documentation of historical and exam elements from today's encounter can be found in the full encounter summary report (not reduplicated in this progress note).  I personally obtained the chief complaint(s) and history of present illness.  I confirmed " and edited as necessary the review of systems, past medical/surgical history, family history, social history, and examination findings as documented by others; and I examined the patient myself.  I personally reviewed the relevant tests, images, and reports as documented above.  I formulated and edited as necessary the assessment and plan and discussed the findings and management plan with the patient and family. - Lorri Guadalupe MD 2/8/2023 10:57 AM

## 2023-02-22 ENCOUNTER — TELEPHONE (OUTPATIENT)
Dept: OPHTHALMOLOGY | Facility: CLINIC | Age: 28
End: 2023-02-22
Payer: COMMERCIAL

## 2023-02-22 NOTE — TELEPHONE ENCOUNTER
Spoke with patient and rescheduled appointment to 3/9 at 12:40pm. Patient would prefer 2/28 so if anything opens up he would like a call. He also would like me to check with Dr. Guadalupe to verify that waiting until 3/9 is okay. I did assure him that she would be okay with it since she gave a range of when she wanted to see him back but I will make sure she knows when she is in tomorrow and will call him if she has any concerns.    Melanie Jeans, Ophthalmic Assistant

## 2023-02-22 NOTE — TELEPHONE ENCOUNTER
[facility] Call Center    Phone Message    May a detailed message be left on voicemail: yes     Reason for Call: Other: Brayan called to reschedule his post op appt to 2/28. Writer did not notice that it was a post op appt to after is was rescheduled to 3/9. Unable to reach the procedure , was able to set appt back to original appt on 2/23. Brayan would like a call please to reschedule. Thanks     Action Taken: Other: Eye    Travel Screening: Not Applicable

## 2023-03-09 ENCOUNTER — OFFICE VISIT (OUTPATIENT)
Dept: OPHTHALMOLOGY | Facility: CLINIC | Age: 28
End: 2023-03-09
Attending: OPHTHALMOLOGY
Payer: COMMERCIAL

## 2023-03-09 DIAGNOSIS — Z98.890 POSTOPERATIVE EYE STATE: Primary | ICD-10-CM

## 2023-03-09 DIAGNOSIS — Z86.69 H/O STRABISMUS: ICD-10-CM

## 2023-03-09 DIAGNOSIS — G93.5 CHIARI MALFORMATION TYPE I (H): ICD-10-CM

## 2023-03-09 PROCEDURE — 99211 OFF/OP EST MAY X REQ PHY/QHP: CPT | Performed by: OPHTHALMOLOGY

## 2023-03-09 PROCEDURE — 99024 POSTOP FOLLOW-UP VISIT: CPT | Performed by: OPHTHALMOLOGY

## 2023-03-09 ASSESSMENT — VISUAL ACUITY
METHOD: SNELLEN - LINEAR
OD_SC: 20/20
OS_SC: 20/20

## 2023-03-09 ASSESSMENT — CONF VISUAL FIELD
OD_SUPERIOR_NASAL_RESTRICTION: 0
OD_INFERIOR_TEMPORAL_RESTRICTION: 0
OD_SUPERIOR_TEMPORAL_RESTRICTION: 0
OS_SUPERIOR_NASAL_RESTRICTION: 0
OS_INFERIOR_NASAL_RESTRICTION: 0
OS_INFERIOR_TEMPORAL_RESTRICTION: 0
OD_INFERIOR_NASAL_RESTRICTION: 0
METHOD: COUNTING FINGERS
OS_SUPERIOR_TEMPORAL_RESTRICTION: 0
OS_NORMAL: 1
OD_NORMAL: 1

## 2023-03-09 ASSESSMENT — TONOMETRY
OS_IOP_MMHG: 14
IOP_METHOD: SINGLE KW ICARE
OD_IOP_MMHG: 13

## 2023-03-09 NOTE — PROGRESS NOTES
"Chief Complaint(s) and History of Present Illness(es)     Post Op (Ophthalmology) Both Eyes            Laterality: both eyes    Associated symptoms: redness.  Negative for double vision and tearing    Comments: POW#7 strab surgery with adjustable suture. (1/18/2023)   Doing well since surgery. Happy with surgical results, no diplopia since procedure. [facility] good, stable.   Denies photophobia, tearing HA.   Inf: pt               History was obtained from the following independent historians: patient    Referring provider: Referred Self     Primary care: [facility] - [facility]   Assessment   Lauro Stephen is a 27 year old male who presents with:       ICD-10-CM    1. Postoperative eye state  Z98.890       2. H/O strabismus  Z86.69       3. Chiari malformation type I (H)  G93.5          Plan  Mr. Stephen is doing OK on POW #7 s/p  1.  Forced duction testing.  2.  Exploration, lysis of adhesions on previously operated right medial rectus and left medial rectus muscles re-recession of right medial rectus muscle from 5.5 to 8.0 mm with a hangback of 2 for a total of 10 mm posterior to the original insertion.  3.  Left medial rectus re-recession from 5.5 mm to 8.5 mm posterior to the original insertion.  4.  Right lateral rectus resection of 4.0 mm with a 2 mm hang back on an adjustable suture.  5.  Subconjunctival injections of Celestone    Excellent alignment after adjustment 02/08/2023 with no diplopia.  F/u PRN       Further details of the management plan can be found in the \"Patient Instructions\" section which was printed and given to the patient at checkout.  No follow-ups on file.   Attending Physician Attestation:  Complete documentation of historical and exam elements from today's encounter can be found in the full encounter summary report (not reduplicated in this progress note).  I personally obtained the chief complaint(s) and history of present illness.  I confirmed and edited as necessary the review " of systems, past medical/surgical history, family history, social history, and examination findings as documented by others; and I examined the patient myself.  I personally reviewed the relevant tests, images, and reports as documented above.  I formulated and edited as necessary the assessment and plan and discussed the findings and management plan with the patient and family. - Lorri Guadalupe MD March 9, 2023

## 2023-03-16 ASSESSMENT — EXTERNAL EXAM - RIGHT EYE: OD_EXAM: NORMAL

## 2023-03-16 ASSESSMENT — EXTERNAL EXAM - LEFT EYE: OS_EXAM: NORMAL

## 2023-03-16 ASSESSMENT — SLIT LAMP EXAM - LIDS
COMMENTS: NORMAL
COMMENTS: NORMAL

## 2023-05-06 ENCOUNTER — HEALTH MAINTENANCE LETTER (OUTPATIENT)
Age: 28
End: 2023-05-06

## 2024-05-15 NOTE — ADDENDUM NOTE
Addendum  created 01/18/23 1826 by Rickie Delacruz MD    Order list changed      
Addendum  created 01/18/23 7632 by Rickie Delacruz MD    Order list changed      
Normal

## 2024-07-14 ENCOUNTER — HEALTH MAINTENANCE LETTER (OUTPATIENT)
Age: 29
End: 2024-07-14

## (undated) DEVICE — SU SILK 5-0 TF 18" N266H

## (undated) DEVICE — EYE PREP BETADINE 5% SOLUTION 30ML 0065-0411-30

## (undated) DEVICE — SU VICRYL 6-0 S-29 12" J556G

## (undated) DEVICE — STRAP KNEE/BODY 31143004

## (undated) DEVICE — LINEN TOWEL PACK X5 5464

## (undated) DEVICE — GLOVE BIOGEL PI MICRO SZ 6.5 48565

## (undated) DEVICE — SOL WATER IRRIG 1000ML BOTTLE 2F7114

## (undated) DEVICE — EYE MARKING PAD 581057

## (undated) DEVICE — TAPE TRANSPORE 1"X1.5YD 1534S-1

## (undated) DEVICE — PACK MINOR EYE

## (undated) DEVICE — POSITIONER ARMBOARD FOAM 1PAIR LF FP-ARMB1

## (undated) DEVICE — PAD ARMBOARD FOAM EGGCRATE COVIDEN 3114367

## (undated) DEVICE — SU MERSILENE 6-0 S-24 18" D-7683

## (undated) DEVICE — DRSG TEGADERM 4X4 3/4" 1626W

## (undated) DEVICE — SU VICRYL 8-0 TG140-8DA 12" J548G

## (undated) DEVICE — GLOVE PROTEXIS MICRO 6.5  2D73PM65

## (undated) DEVICE — APPLICATORS COTTON-TIPPED 3" PKG OF 2 C15050-003

## (undated) DEVICE — ESU EYE LOW TEMP 65410-010

## (undated) DEVICE — COVER CAMERA IN-LIGHT DISP LT-C02

## (undated) DEVICE — DRSG EYE PAD STERILE 1.63X2.63" NON21600

## (undated) RX ORDER — FENTANYL CITRATE 50 UG/ML
INJECTION, SOLUTION INTRAMUSCULAR; INTRAVENOUS
Status: DISPENSED
Start: 2018-05-16

## (undated) RX ORDER — ONDANSETRON 2 MG/ML
INJECTION INTRAMUSCULAR; INTRAVENOUS
Status: DISPENSED
Start: 2023-01-18

## (undated) RX ORDER — FENTANYL CITRATE 50 UG/ML
INJECTION, SOLUTION INTRAMUSCULAR; INTRAVENOUS
Status: DISPENSED
Start: 2023-01-18

## (undated) RX ORDER — LIDOCAINE HYDROCHLORIDE 20 MG/ML
INJECTION, SOLUTION EPIDURAL; INFILTRATION; INTRACAUDAL; PERINEURAL
Status: DISPENSED
Start: 2018-05-16

## (undated) RX ORDER — KETOROLAC TROMETHAMINE 30 MG/ML
INJECTION, SOLUTION INTRAMUSCULAR; INTRAVENOUS
Status: DISPENSED
Start: 2018-05-16

## (undated) RX ORDER — PROPOFOL 10 MG/ML
INJECTION, EMULSION INTRAVENOUS
Status: DISPENSED
Start: 2023-01-18

## (undated) RX ORDER — PHENYLEPHRINE HCL IN 0.9% NACL 1 MG/10 ML
SYRINGE (ML) INTRAVENOUS
Status: DISPENSED
Start: 2018-05-16

## (undated) RX ORDER — GABAPENTIN 300 MG/1
CAPSULE ORAL
Status: DISPENSED
Start: 2018-05-16

## (undated) RX ORDER — ACETAMINOPHEN 325 MG/1
TABLET ORAL
Status: DISPENSED
Start: 2023-01-18

## (undated) RX ORDER — ONDANSETRON 4 MG/1
TABLET, ORALLY DISINTEGRATING ORAL
Status: DISPENSED
Start: 2018-05-16

## (undated) RX ORDER — ONDANSETRON 2 MG/ML
INJECTION INTRAMUSCULAR; INTRAVENOUS
Status: DISPENSED
Start: 2018-05-16

## (undated) RX ORDER — PROPOFOL 10 MG/ML
INJECTION, EMULSION INTRAVENOUS
Status: DISPENSED
Start: 2018-05-16

## (undated) RX ORDER — DEXAMETHASONE SODIUM PHOSPHATE 4 MG/ML
INJECTION, SOLUTION INTRA-ARTICULAR; INTRALESIONAL; INTRAMUSCULAR; INTRAVENOUS; SOFT TISSUE
Status: DISPENSED
Start: 2023-01-18

## (undated) RX ORDER — ACETAMINOPHEN 325 MG/1
TABLET ORAL
Status: DISPENSED
Start: 2018-05-16

## (undated) RX ORDER — KETOROLAC TROMETHAMINE 30 MG/ML
INJECTION, SOLUTION INTRAMUSCULAR; INTRAVENOUS
Status: DISPENSED
Start: 2023-01-18